# Patient Record
Sex: FEMALE | Race: WHITE | NOT HISPANIC OR LATINO | Employment: OTHER | URBAN - METROPOLITAN AREA
[De-identification: names, ages, dates, MRNs, and addresses within clinical notes are randomized per-mention and may not be internally consistent; named-entity substitution may affect disease eponyms.]

---

## 2022-10-05 DIAGNOSIS — R25.1 TREMOR: Primary | ICD-10-CM

## 2022-10-25 RX ORDER — BENAZEPRIL HYDROCHLORIDE 20 MG/1
20 TABLET ORAL EVERY MORNING
COMMUNITY
Start: 2021-10-07

## 2022-10-25 RX ORDER — CARBIDOPA AND LEVODOPA 25; 100 MG/1; MG/1
1 TABLET ORAL 2 TIMES DAILY
COMMUNITY
Start: 2022-10-04 | End: 2023-04-24 | Stop reason: SDUPTHER

## 2022-10-25 RX ORDER — ASPIRIN 81 MG/1
81 TABLET ORAL EVERY MORNING
COMMUNITY

## 2022-10-25 RX ORDER — NABUMETONE 500 MG/1
500 TABLET, FILM COATED ORAL EVERY MORNING
COMMUNITY
Start: 2021-10-07 | End: 2023-04-24

## 2022-10-25 RX ORDER — AMLODIPINE BESYLATE 5 MG/1
5 TABLET ORAL EVERY MORNING
COMMUNITY
Start: 2021-10-07

## 2022-10-25 RX ORDER — TRAMADOL HYDROCHLORIDE 50 MG/1
50 TABLET ORAL EVERY 12 HOURS PRN
COMMUNITY
Start: 2021-10-07 | End: 2024-02-16

## 2022-10-25 RX ORDER — ATORVASTATIN CALCIUM 10 MG/1
10 TABLET, FILM COATED ORAL EVERY MORNING
COMMUNITY
Start: 2021-10-07

## 2022-10-25 RX ORDER — UBIDECARENONE 30 MG
10 CAPSULE ORAL EVERY MORNING
COMMUNITY

## 2022-10-25 RX ORDER — FUROSEMIDE 20 MG/1
20 TABLET ORAL EVERY MORNING
COMMUNITY
Start: 2021-10-07

## 2022-10-25 RX ORDER — CHOLECALCIFEROL (VITAMIN D3) 25 MCG
1000 TABLET ORAL EVERY MORNING
COMMUNITY

## 2022-10-25 NOTE — DISCHARGE INSTRUCTIONS
Nothing to eat or drink after midnight. Take Amlodipine, Benazepril and Carb/Levodopa AM of procedure with small sip of water.

## 2022-10-31 ENCOUNTER — CLINICAL SUPPORT (OUTPATIENT)
Dept: FAMILY MEDICINE | Facility: CLINIC | Age: 87
End: 2022-10-31
Payer: MEDICARE

## 2022-10-31 PROCEDURE — G0008 ADMIN INFLUENZA VIRUS VAC: HCPCS | Mod: ,,, | Performed by: NURSE PRACTITIONER

## 2022-10-31 PROCEDURE — G0008 FLU VACCINE (QUAD) GREATER THAN OR EQUAL TO 3YO PRESERVATIVE FREE IM: ICD-10-PCS | Mod: ,,, | Performed by: NURSE PRACTITIONER

## 2022-10-31 PROCEDURE — 90686 FLU VACCINE (QUAD) GREATER THAN OR EQUAL TO 3YO PRESERVATIVE FREE IM: ICD-10-PCS | Mod: ,,, | Performed by: NURSE PRACTITIONER

## 2022-10-31 PROCEDURE — 90686 IIV4 VACC NO PRSV 0.5 ML IM: CPT | Mod: ,,, | Performed by: NURSE PRACTITIONER

## 2022-11-06 ENCOUNTER — ANESTHESIA EVENT (OUTPATIENT)
Dept: SURGERY | Facility: HOSPITAL | Age: 87
End: 2022-11-06
Payer: MEDICARE

## 2022-11-06 NOTE — ANESTHESIA PREPROCEDURE EVALUATION
11/06/2022  Juana Xavier is a 86 y.o., female.      Pre-op Assessment    I have reviewed the Patient Summary Reports.     I have reviewed the Nursing Notes. I have reviewed the NPO Status.   I have reviewed the Medications.     Review of Systems  Anesthesia Hx:  Denies Family Hx of Anesthesia complications.   Denies Personal Hx of Anesthesia complications.   Social:  Non-Smoker, No Alcohol Use    Hematology/Oncology:  Hematology Normal   Oncology Normal     EENT/Dental:EENT/Dental Normal   Cardiovascular:   Hypertension ECG has been reviewed.    Pulmonary:  Pulmonary Normal    Renal/:  Renal/ Normal     Hepatic/GI:  Hepatic/GI Normal    Musculoskeletal:  Musculoskeletal Normal    Neurological:  Neurology Normal    Endocrine:  Endocrine Normal    Dermatological:  Skin Normal    Psych:  Psychiatric Normal           Physical Exam  General: Cooperative, Alert and Oriented    Airway:  Mallampati: II   Mouth Opening: Normal  TM Distance: Normal  Tongue: Normal  Neck ROM: Normal ROM    Dental:  Intact        Anesthesia Plan  Type of Anesthesia, risks & benefits discussed:    Anesthesia Type: MAC  Intra-op Monitoring Plan: Standard ASA Monitors  Post Op Pain Control Plan: multimodal analgesia  Informed Consent: Informed consent signed with the Patient and all parties understand the risks and agree with anesthesia plan.  All questions answered. Patient consented to blood products? Yes  ASA Score: 2    Ready For Surgery From Anesthesia Perspective.     .

## 2022-11-07 ENCOUNTER — HOSPITAL ENCOUNTER (OUTPATIENT)
Facility: HOSPITAL | Age: 87
Discharge: HOME | End: 2022-11-07
Attending: OPHTHALMOLOGY | Admitting: OPHTHALMOLOGY
Payer: MEDICARE

## 2022-11-07 ENCOUNTER — ANESTHESIA (OUTPATIENT)
Dept: SURGERY | Facility: HOSPITAL | Age: 87
End: 2022-11-07
Payer: MEDICARE

## 2022-11-07 VITALS
BODY MASS INDEX: 26.68 KG/M2 | OXYGEN SATURATION: 95 % | RESPIRATION RATE: 20 BRPM | WEIGHT: 170 LBS | HEART RATE: 80 BPM | SYSTOLIC BLOOD PRESSURE: 124 MMHG | DIASTOLIC BLOOD PRESSURE: 76 MMHG | HEIGHT: 67 IN | TEMPERATURE: 98 F

## 2022-11-07 DIAGNOSIS — H26.9 CATARACT OF RIGHT EYE, UNSPECIFIED CATARACT TYPE: ICD-10-CM

## 2022-11-07 PROCEDURE — 63600175 PHARM REV CODE 636 W HCPCS: Performed by: NURSE ANESTHETIST, CERTIFIED REGISTERED

## 2022-11-07 PROCEDURE — 36000707: Performed by: OPHTHALMOLOGY

## 2022-11-07 PROCEDURE — 37000008 HC ANESTHESIA 1ST 15 MINUTES: Performed by: OPHTHALMOLOGY

## 2022-11-07 PROCEDURE — 63600175 PHARM REV CODE 636 W HCPCS: Performed by: OPHTHALMOLOGY

## 2022-11-07 PROCEDURE — 25000003 PHARM REV CODE 250: Performed by: OPHTHALMOLOGY

## 2022-11-07 PROCEDURE — 71000015 HC POSTOP RECOV 1ST HR: Performed by: OPHTHALMOLOGY

## 2022-11-07 PROCEDURE — 36000706: Performed by: OPHTHALMOLOGY

## 2022-11-07 PROCEDURE — 71000016 HC POSTOP RECOV ADDL HR: Performed by: OPHTHALMOLOGY

## 2022-11-07 PROCEDURE — 25000003 PHARM REV CODE 250

## 2022-11-07 PROCEDURE — 63600175 PHARM REV CODE 636 W HCPCS

## 2022-11-07 PROCEDURE — 37000009 HC ANESTHESIA EA ADD 15 MINS: Performed by: OPHTHALMOLOGY

## 2022-11-07 PROCEDURE — V2630 ANTER CHAMBER INTRAOCUL LENS: HCPCS | Performed by: OPHTHALMOLOGY

## 2022-11-07 PROCEDURE — 63600175 PHARM REV CODE 636 W HCPCS: Performed by: ANESTHESIOLOGY

## 2022-11-07 DEVICE — IMPLANTABLE DEVICE: Type: IMPLANTABLE DEVICE | Site: EYE | Status: FUNCTIONAL

## 2022-11-07 RX ORDER — TIMOLOL MALEATE 5 MG/ML
SOLUTION/ DROPS OPHTHALMIC
Status: DISCONTINUED | OUTPATIENT
Start: 2022-11-07 | End: 2022-11-07 | Stop reason: HOSPADM

## 2022-11-07 RX ORDER — MIDAZOLAM HYDROCHLORIDE 1 MG/ML
INJECTION INTRAMUSCULAR; INTRAVENOUS
Status: DISCONTINUED | OUTPATIENT
Start: 2022-11-07 | End: 2022-11-07

## 2022-11-07 RX ORDER — GENTAMICIN SULFATE 40 MG/ML
INJECTION, SOLUTION INTRAMUSCULAR; INTRAVENOUS
Status: DISCONTINUED | OUTPATIENT
Start: 2022-11-07 | End: 2022-11-07 | Stop reason: HOSPADM

## 2022-11-07 RX ORDER — NEOMYCIN SULFATE, POLYMYXIN B SULFATE, AND DEXAMETHASONE 3.5; 10000; 1 MG/G; [USP'U]/G; MG/G
OINTMENT OPHTHALMIC
Status: DISCONTINUED | OUTPATIENT
Start: 2022-11-07 | End: 2022-11-07 | Stop reason: HOSPADM

## 2022-11-07 RX ORDER — SODIUM CHLORIDE, SODIUM LACTATE, POTASSIUM CHLORIDE, CALCIUM CHLORIDE 600; 310; 30; 20 MG/100ML; MG/100ML; MG/100ML; MG/100ML
INJECTION, SOLUTION INTRAVENOUS CONTINUOUS
Status: DISCONTINUED | OUTPATIENT
Start: 2022-11-07 | End: 2022-11-07 | Stop reason: HOSPADM

## 2022-11-07 RX ORDER — PILOCARPINE HYDROCHLORIDE 20 MG/ML
SOLUTION/ DROPS OPHTHALMIC
Status: DISCONTINUED | OUTPATIENT
Start: 2022-11-07 | End: 2022-11-07 | Stop reason: HOSPADM

## 2022-11-07 RX ORDER — PHENYLEPHRINE HYDROCHLORIDE 25 MG/ML
1 SOLUTION/ DROPS OPHTHALMIC
Status: COMPLETED | OUTPATIENT
Start: 2022-11-07 | End: 2022-11-07

## 2022-11-07 RX ORDER — TROPICAMIDE 10 MG/ML
1 SOLUTION/ DROPS OPHTHALMIC
Status: COMPLETED | OUTPATIENT
Start: 2022-11-07 | End: 2022-11-07

## 2022-11-07 RX ORDER — PROPARACAINE HYDROCHLORIDE 5 MG/ML
1 SOLUTION/ DROPS OPHTHALMIC ONCE
Status: COMPLETED | OUTPATIENT
Start: 2022-11-07 | End: 2022-11-07

## 2022-11-07 RX ORDER — FENTANYL CITRATE 50 UG/ML
INJECTION, SOLUTION INTRAMUSCULAR; INTRAVENOUS
Status: DISCONTINUED | OUTPATIENT
Start: 2022-11-07 | End: 2022-11-07

## 2022-11-07 RX ORDER — EPINEPHRINE CONVENIENCE KIT 1 MG/ML(1)
KIT INTRAMUSCULAR; SUBCUTANEOUS
Status: DISCONTINUED | OUTPATIENT
Start: 2022-11-07 | End: 2022-11-07 | Stop reason: HOSPADM

## 2022-11-07 RX ADMIN — PROPARACAINE HYDROCHLORIDE 1 DROP: 5 SOLUTION/ DROPS OPHTHALMIC at 06:11

## 2022-11-07 RX ADMIN — PHENYLEPHRINE HYDROCHLORIDE 1 DROP: 25 SOLUTION/ DROPS OPHTHALMIC at 06:11

## 2022-11-07 RX ADMIN — MIDAZOLAM HYDROCHLORIDE 1 MG: 1 INJECTION, SOLUTION INTRAMUSCULAR; INTRAVENOUS at 07:11

## 2022-11-07 RX ADMIN — FENTANYL CITRATE 50 MCG: 50 INJECTION, SOLUTION INTRAMUSCULAR; INTRAVENOUS at 07:11

## 2022-11-07 RX ADMIN — TROPICAMIDE 1 DROP: 10 SOLUTION/ DROPS OPHTHALMIC at 06:11

## 2022-11-07 RX ADMIN — LIDOCAINE HYDROCHLORIDE: 35 GEL OPHTHALMIC at 06:11

## 2022-11-07 RX ADMIN — SODIUM CHLORIDE, POTASSIUM CHLORIDE, SODIUM LACTATE AND CALCIUM CHLORIDE: 600; 310; 30; 20 INJECTION, SOLUTION INTRAVENOUS at 06:11

## 2022-11-07 NOTE — ANESTHESIA POSTPROCEDURE EVALUATION
Anesthesia Post Evaluation    Patient: Juana Xavier    Procedure(s) Performed: Procedure(s) (LRB):  PHACOEMULSIFICATION, CATARACT, WITH IOL INSERTION (Right)    Final Anesthesia Type: MAC      Patient participation: Yes- Able to Participate  Level of consciousness: awake and alert and oriented  Post-procedure vital signs: reviewed and stable  Pain management: adequate  Airway patency: patent    PONV status at discharge: No PONV  Anesthetic complications: no      Cardiovascular status: stable  Respiratory status: unassisted, spontaneous ventilation and room air  Hydration status: euvolemic  Follow-up not needed.          Vitals Value Taken Time   /87 11/07/22 0736   Temp 36.6 °C (97.8 °F) 11/07/22 0642   Pulse 84 11/07/22 0642   Resp 20 11/07/22 0642   SpO2 99 % 11/07/22 0642         No case tracking events are documented in the log.      Pain/Ester Score: No data recorded

## 2023-04-24 ENCOUNTER — OFFICE VISIT (OUTPATIENT)
Dept: NEUROLOGY | Facility: CLINIC | Age: 88
End: 2023-04-24
Payer: MEDICARE

## 2023-04-24 VITALS
WEIGHT: 167 LBS | SYSTOLIC BLOOD PRESSURE: 126 MMHG | DIASTOLIC BLOOD PRESSURE: 90 MMHG | HEIGHT: 67 IN | BODY MASS INDEX: 26.21 KG/M2

## 2023-04-24 DIAGNOSIS — R25.1 TREMOR: ICD-10-CM

## 2023-04-24 DIAGNOSIS — G20.A1 PARKINSON'S DISEASE: Primary | ICD-10-CM

## 2023-04-24 PROCEDURE — 99999 PR PBB SHADOW E&M-EST. PATIENT-LVL III: CPT | Mod: PBBFAC,,, | Performed by: SPECIALIST

## 2023-04-24 PROCEDURE — 99213 OFFICE O/P EST LOW 20 MIN: CPT | Mod: PBBFAC | Performed by: SPECIALIST

## 2023-04-24 PROCEDURE — 99205 OFFICE O/P NEW HI 60 MIN: CPT | Mod: S$PBB,,, | Performed by: SPECIALIST

## 2023-04-24 PROCEDURE — 99999 PR PBB SHADOW E&M-EST. PATIENT-LVL III: ICD-10-PCS | Mod: PBBFAC,,, | Performed by: SPECIALIST

## 2023-04-24 PROCEDURE — 99205 PR OFFICE/OUTPT VISIT, NEW, LEVL V, 60-74 MIN: ICD-10-PCS | Mod: S$PBB,,, | Performed by: SPECIALIST

## 2023-04-24 RX ORDER — CARBIDOPA AND LEVODOPA 25; 100 MG/1; MG/1
1 TABLET ORAL 3 TIMES DAILY
Qty: 270 TABLET | Refills: 3 | Status: SHIPPED | OUTPATIENT
Start: 2023-04-24 | End: 2023-08-23 | Stop reason: SDUPTHER

## 2023-04-24 RX ORDER — NABUMETONE 750 MG/1
750 TABLET, FILM COATED ORAL 2 TIMES DAILY
COMMUNITY
Start: 2023-04-04

## 2023-04-24 NOTE — PROGRESS NOTES
"Subjective:       Patient ID: Juana Xavier is a 87 y.o. female.    Chief Complaint: PD     HPI:            New pt ref by Dr Parra for tremor eval (Here for tremor eval//Pt reports onset of intermittent tremors 1 yr ago; tremors to R hand and jaw. Taking Sinemet  mg (1 tab BID) has improved hand tremors. Use of walker at all times; some R foot shuffling. Drooling has improved some, some speech diff and visual changes at times; no swallowing difficulties or hallucinations. CT Head 2021 under imaging. )      notes may also be on facesheet for HPI, ROS, and other sections     Review of Systems -            Social History     Socioeconomic History    Marital status:    Tobacco Use    Smoking status: Never    Smokeless tobacco: Never   Substance and Sexual Activity    Alcohol use: Not Currently   Social History Narrative    ** Merged History Encounter **         24y  __Working  Retired, worked as:   __drives  _._does not drive     ----------------------------  Breast cancer  HTN (hypertension)    Current Outpatient Medications   Medication Instructions    amLODIPine (NORVASC) 5 mg, Oral, Every morning    aspirin (ECOTRIN) 81 mg, Oral, Every morning    atorvastatin (LIPITOR) 10 mg, Oral, Every morning    benazepriL (LOTENSIN) 20 mg, Oral, Every morning    calcium carbonate/vitamin D3 (CALTRATE 600 + D ORAL) 1 tablet, Oral, Every morning    carbidopa-levodopa  mg (SINEMET)  mg per tablet 1 tablet, Oral, 3 times daily    co-enzyme Q-10 10 mg, Oral, Every morning    furosemide (LASIX) 20 mg, Oral, Every morning    nabumetone (RELAFEN) 750 mg, Oral, 2 times daily    traMADoL (ULTRAM) 50 mg, Oral, Every 12 hours PRN    vitamin D (VITAMIN D3) 1,000 Units, Oral, Every morning    vitamin E 600 Units, Oral, Every morning        Objective:        Exam:   BP (!) 126/90 (BP Location: Left arm, Patient Position: Sitting)   Ht 5' 7" (1.702 m)   Wt 75.8 kg (167 lb)   BMI 26.16 kg/m²     General " Exam  __unaccompanied  if accompanied, by__ sister   body habitus_ Body mass index is 26.16 kg/m².    mental status_alert and appropriate  oropharynx_Mallampati grade_  neck_  Heart__ RRR syst murmur   extremities_  skin_    Neurological:  cortical function__  sharp   Speech __ normal   cranial nerves:  CN 2 VF_ok   fundi_   CN 3, 4, 6 EOMs_slow   CN 3, pupils_ok    CN 7_no lower face asymmetry  CN 8_hearing _ ok  CN 12 tongue_ok    Motor__ decr RUE dexterity due to PD   tone:   muscle stretch reflexes__  Vib sens_  Pin sens_  plantars__  tremor: _ minimal if any   coordination: _  gait_ walker  slow   Romberg:     Neuroimaging:  Study / studies: ct h 2021  __Images and imaging reports reviewed.      Rads summary: No acute or adverse intracranial finding          My comments: agree ok for age     Labs:      _._ new patient   ___ multiple issues/ diagnoses or problems  [if not enumerated in note then discussed in encounter but not documented]    complexity of data     _._high _mod   _._ images and reports reviewed:  _._ hx obtained from family or accompaniment:   __other studies reviewed   __studies ordered __   __studies considered or discussed but not ordered __  __DDx discussed __    Risks    _._high _mod   _._ (possible or definite) neurodegenerative condition and inherent progression  __ (poss or def) autoimmune condition with possibility of flares or unexpected attack  __ (poss or def) seiz d.o. with possib of recurr seiz's   __ cerebrovasc ds with risk of recurrence of stroke  __ CNS meds (and/or) potentially high risk non CNS meds which may cause medical or behavioral side effects  _._ fall risk  _._ driving discussed   __ diagnosis unclear or DDx wide making risk uncertain to high  __other:    MDM/Medical Decision Making     .__high  _moderate     Parkinson's medications can be associated with certain side effects.  Nausea and abdominal symptoms typically improve in time.  Impulse control disorders including  persistent thoughts or behaviors involving shopping gambling or sex can be problematic.  Excessive daytime sleepiness including car crashes have been reported.   Delusions hallucinations and paranoia can also occur, typically with higher doses in older patients.   Abrupt stoppage of high dose parkinson's medications can be medically troublesome.            Assessment/Plan:       Problem List Items Addressed This Visit          Neuro    Parkinson's disease - Primary     Other Visit Diagnoses       Tremor                  Other comments/ follow up:        Imaging orders(if any):   No orders of the defined types were placed in this encounter.     Medications Ordered This Encounter   Medications    carbidopa-levodopa  mg (SINEMET)  mg per tablet     Sig: Take 1 tablet by mouth 3 (three) times daily.     Dispense:  270 tablet     Refill:  3          Aim follow up __6wk     MD KRISHNA WayA

## 2023-06-05 ENCOUNTER — OFFICE VISIT (OUTPATIENT)
Dept: NEUROLOGY | Facility: CLINIC | Age: 88
End: 2023-06-05
Payer: MEDICARE

## 2023-06-05 VITALS
HEIGHT: 67 IN | WEIGHT: 167 LBS | BODY MASS INDEX: 26.21 KG/M2 | SYSTOLIC BLOOD PRESSURE: 124 MMHG | DIASTOLIC BLOOD PRESSURE: 86 MMHG

## 2023-06-05 DIAGNOSIS — G20.A1 PARKINSON'S DISEASE: Primary | ICD-10-CM

## 2023-06-05 PROCEDURE — 99999 PR PBB SHADOW E&M-EST. PATIENT-LVL IV: ICD-10-PCS | Mod: PBBFAC,,, | Performed by: NURSE PRACTITIONER

## 2023-06-05 PROCEDURE — 99214 OFFICE O/P EST MOD 30 MIN: CPT | Mod: PBBFAC | Performed by: NURSE PRACTITIONER

## 2023-06-05 PROCEDURE — 99999 PR PBB SHADOW E&M-EST. PATIENT-LVL IV: CPT | Mod: PBBFAC,,, | Performed by: NURSE PRACTITIONER

## 2023-06-05 PROCEDURE — 99213 OFFICE O/P EST LOW 20 MIN: CPT | Mod: S$PBB,,, | Performed by: NURSE PRACTITIONER

## 2023-06-05 PROCEDURE — 99213 PR OFFICE/OUTPT VISIT, EST, LEVL III, 20-29 MIN: ICD-10-PCS | Mod: S$PBB,,, | Performed by: NURSE PRACTITIONER

## 2023-06-05 NOTE — PROGRESS NOTES
"  Neurology Follow up Note    Subjective:         Patient ID: Juana Xavier is a 87 y.o. female.    Chief Complaint: 6 week PD f/u    HPI:            Pt reports tremors better controlled since CD/LD dose incr last visit; taking Sinemet  mg (1 tab TID). Unsteady gait unchanged; no swallowing diff, hallucinations or recent falls.     Overall, feeling "much better"    ROS: as per HPI, otherwise pertinent systems review is negative          Past Medical History:   Diagnosis Date    Breast cancer     HTN (hypertension)        Past Surgical History:   Procedure Laterality Date    BREAST LUMPECTOMY Right     HIP REPLACEMENT ARTHROPLASTY Left     TONSILLECTOMY         Family History   Problem Relation Age of Onset    Heart disease Mother     Heart disease Father        Social History     Socioeconomic History    Marital status:    Tobacco Use    Smoking status: Never    Smokeless tobacco: Never   Substance and Sexual Activity    Alcohol use: Not Currently   Social History Narrative    ** Merged History Encounter **            Review of patient's allergies indicates:  No Known Allergies    Current Outpatient Medications   Medication Instructions    amLODIPine (NORVASC) 5 mg, Oral, Every morning    aspirin (ECOTRIN) 81 mg, Oral, Every morning    atorvastatin (LIPITOR) 10 mg, Oral, Every morning    benazepriL (LOTENSIN) 20 mg, Oral, Every morning    calcium carbonate/vitamin D3 (CALTRATE 600 + D ORAL) 1 tablet, Oral, Every morning    carbidopa-levodopa  mg (SINEMET)  mg per tablet 1 tablet, Oral, 3 times daily    co-enzyme Q-10 10 mg, Oral, Every morning    furosemide (LASIX) 20 mg, Oral, Every morning    nabumetone (RELAFEN) 750 mg, Oral, 2 times daily    traMADoL (ULTRAM) 50 mg, Oral, Every 12 hours PRN    vitamin D (VITAMIN D3) 1,000 Units, Oral, Every morning    vitamin E 600 Units, Oral, Every morning       Objective:      Exam:   Visit Vitals  /86 (BP Location: Left arm, Patient " "Position: Sitting)   Ht 5' 7" (1.702 m)   Wt 75.8 kg (167 lb)   BMI 26.16 kg/m²       Physical Exam  Vitals reviewed.   Constitutional:       Appearance: Normal appearance.      Accompanied by: two sisters  HENT:      Ears:      Comments: Hearing normal.  Eyes:      Extraocular Movements: Extraocular movements intact.      VF's nml  Cardiovascular:      Rate and Rhythm: Normal rate and regular rhythm. Systolic murmur   Pulmonary:      Effort: Pulmonary effort is normal.      Breath sounds: Normal breath sounds.   Musculoskeletal:         General: Normal range of motion.   Skin:     General: Skin is warm and dry.   Neurological:      General: No focal deficit present.      Mental Status: she is alert and oriented to person, place, and time.      Speech: nml     Face: symmetric     Motor: nonlateralizing      Coordination: F to N ok      Sensation: Vib nml; pin, if done, was nml      Reflexes: B knee jerks nml; B ankle jerks nml      Tone: B cogwheel rigidity; R>L     Tremor: LUE rest tremor     Gait : rollator; parkinsonian  Psychiatric:         Mood and Affect: Mood normal.         Behavior: Behavior normal.         Assessment/Plan:   1. Parkinson's disease  Continue present management  CD/LD 25/100 mg tab, 1 tab three times per day    Reminded patient/family about potential PD med side effects, which include but not limited to impulse control disorders (ex: pathologic gambling, shopping, or preoccupation with sexual thoughts or behaviors), excessive daytime sleepiness, hallucinations and sleep attacks. Discussed that driving may pose an increased risk to patients on these medications. Hypotension is also occasionally associated with Parkinson's medications. Any of these complications should be reported to the prescribing physician or provider so that appropriate further modifications can occur.    Fall precautions discussed    She does not drive    Safety discussed; fall mitigation techniques discussed       Follow " up in about 3 months (around 9/5/2023), or if symptoms worsen or fail to improve, for Parkinson's Disease.      Don Nieto, MSN, APRN, AGACNP-BC

## 2023-08-23 ENCOUNTER — OFFICE VISIT (OUTPATIENT)
Dept: NEUROLOGY | Facility: CLINIC | Age: 88
End: 2023-08-23
Payer: MEDICARE

## 2023-08-23 VITALS
DIASTOLIC BLOOD PRESSURE: 94 MMHG | WEIGHT: 167 LBS | HEIGHT: 67 IN | BODY MASS INDEX: 26.21 KG/M2 | SYSTOLIC BLOOD PRESSURE: 162 MMHG

## 2023-08-23 DIAGNOSIS — G20.A1 PARKINSON'S DISEASE: Primary | ICD-10-CM

## 2023-08-23 PROCEDURE — 99214 PR OFFICE/OUTPT VISIT, EST, LEVL IV, 30-39 MIN: ICD-10-PCS | Mod: S$PBB,,, | Performed by: NURSE PRACTITIONER

## 2023-08-23 PROCEDURE — 99999 PR PBB SHADOW E&M-EST. PATIENT-LVL III: CPT | Mod: PBBFAC,,, | Performed by: NURSE PRACTITIONER

## 2023-08-23 PROCEDURE — 99999 PR PBB SHADOW E&M-EST. PATIENT-LVL III: ICD-10-PCS | Mod: PBBFAC,,, | Performed by: NURSE PRACTITIONER

## 2023-08-23 PROCEDURE — 99213 OFFICE O/P EST LOW 20 MIN: CPT | Mod: PBBFAC | Performed by: NURSE PRACTITIONER

## 2023-08-23 PROCEDURE — 99214 OFFICE O/P EST MOD 30 MIN: CPT | Mod: S$PBB,,, | Performed by: NURSE PRACTITIONER

## 2023-08-23 RX ORDER — MULTIVITAMIN
1 TABLET ORAL DAILY
COMMUNITY

## 2023-08-23 RX ORDER — CARBIDOPA AND LEVODOPA 25; 100 MG/1; MG/1
1 TABLET ORAL 4 TIMES DAILY
Qty: 120 TABLET | Refills: 3 | Status: SHIPPED | OUTPATIENT
Start: 2023-08-23 | End: 2023-10-11 | Stop reason: SDUPTHER

## 2023-08-23 NOTE — PROGRESS NOTES
Neurology Follow up Note    Subjective:         Patient ID: Juana Xavier is a 87 y.o. female.    Chief Complaint: F/U PD.    HPI:            Pt states she has chin tremors that are abt the same. L hand rest tremor    States she has a shuffled gait wo falls or freezing. Ambulates w a Rolator. Reports increased stiffness and limited ROM, even challenging getting in and out of the car.     Diff w speech and drooling. Denies diff w swallowing or hallucinations.     Sleeping well  w vivid dreams.     Does not drive, lives alone and her sister lives next door, and is able to do all ADL's alone.     CD/LD 25/100 mg tab, 1 tab three times per day    ROS: as per HPI, otherwise pertinent systems review is negative          Past Medical History:   Diagnosis Date    Breast cancer     HTN (hypertension)        Past Surgical History:   Procedure Laterality Date    BREAST LUMPECTOMY Right     HIP REPLACEMENT ARTHROPLASTY Left     TONSILLECTOMY         Family History   Problem Relation Age of Onset    Heart disease Mother     Heart disease Father        Social History     Socioeconomic History    Marital status:    Tobacco Use    Smoking status: Never    Smokeless tobacco: Never   Substance and Sexual Activity    Alcohol use: Not Currently   Social History Narrative    ** Merged History Encounter **            Review of patient's allergies indicates:  No Known Allergies    Current Outpatient Medications   Medication Instructions    amLODIPine (NORVASC) 5 mg, Oral, Every morning    aspirin (ECOTRIN) 81 mg, Oral, Every morning    atorvastatin (LIPITOR) 10 mg, Oral, Every morning    benazepriL (LOTENSIN) 20 mg, Oral, Every morning    calcium carbonate/vitamin D3 (CALTRATE 600 + D ORAL) 1 tablet, Oral, Every morning    carbidopa-levodopa  mg (SINEMET)  mg per tablet 1 tablet, Oral, 3 times daily    co-enzyme Q-10 10 mg, Oral, Every morning    furosemide (LASIX) 20 mg, Oral, Every morning    multivitamin (ONE  "DAILY MULTIVITAMIN) per tablet 1 tablet, Oral, Daily    nabumetone (RELAFEN) 750 mg, Oral, 2 times daily    traMADoL (ULTRAM) 50 mg, Oral, Every 12 hours PRN    vitamin D (VITAMIN D3) 1,000 Units, Oral, Every morning    vitamin E 600 Units, Oral, Every morning       Objective:      Exam:   Visit Vitals  BP (!) 162/94   Ht 5' 7" (1.702 m)   Wt 75.8 kg (167 lb)   BMI 26.16 kg/m²       Physical Exam  Vitals reviewed.   Constitutional:       Appearance: Parkinsonian; masked face and decreased eye blink     Accompanied by: two sisters  HENT:      Ears:      Comments: Hearing normal.  Eyes:      Extraocular Movements: Extraocular movements intact.      VF's nml  Cardiovascular:      Rate and Rhythm: Normal rate and regular rhythm. Systolic murmur   Pulmonary:      Effort: Pulmonary effort is normal.      Breath sounds: Normal breath sounds.   Musculoskeletal:         General: Normal range of motion.      1+ B pretibial edema  Skin:     General: Skin is warm and dry.   Neurological:      General: No focal deficit present.      Mental Status: she is alert and oriented to person, place, and time.      Speech: monotoned and hypophonia, slightly dysarthric      Face: symmetric     Motor: nonlateralizing      Coordination: F to N bradykinetic      Tone: B cogwheel rigidity; R>L     Tremor: LUE rest tremor     Gait : rollator; parkinsonian  Psychiatric:         Mood and Affect: Mood normal.         Behavior: Behavior normal.            Assessment/Plan:   1. Parkinson's disease    Rigidity and tremor; increase the CD/LD 25/100 mg tab to 1 tab four times per day.  7a, 11, 3p, 7pm    Reminded patient/family about potential PD med side effects, which include but not limited to impulse control disorders (ex: pathologic gambling, shopping, or preoccupation with sexual thoughts or behaviors), excessive daytime sleepiness, hallucinations and sleep attacks. Discussed that driving may pose an increased risk to patients on these " medications. Hypotension is also occasionally associated with Parkinson's medications. Any of these complications should be reported to the prescribing physician or provider so that appropriate further modifications can occur.    Fall precautions discussed    BP recheck at end of visit 162/90; I ask that she reach out to PCP to report elevated BP; she agrees to do so today    Follow up in about 2 months (around 10/23/2023) for Parkinson's Disease.      Don Nieto, MSN, APRN, AGACNP-BC

## 2023-10-11 ENCOUNTER — OFFICE VISIT (OUTPATIENT)
Dept: NEUROLOGY | Facility: CLINIC | Age: 88
End: 2023-10-11
Payer: MEDICARE

## 2023-10-11 VITALS
HEIGHT: 67 IN | SYSTOLIC BLOOD PRESSURE: 154 MMHG | WEIGHT: 167 LBS | DIASTOLIC BLOOD PRESSURE: 98 MMHG | BODY MASS INDEX: 26.21 KG/M2

## 2023-10-11 DIAGNOSIS — G20.A1 PARKINSON'S DISEASE WITHOUT DYSKINESIA OR FLUCTUATING MANIFESTATIONS: Primary | ICD-10-CM

## 2023-10-11 DIAGNOSIS — G20.A1 PARKINSON'S DISEASE: ICD-10-CM

## 2023-10-11 PROCEDURE — 99999 PR PBB SHADOW E&M-EST. PATIENT-LVL III: CPT | Mod: PBBFAC,,, | Performed by: NURSE PRACTITIONER

## 2023-10-11 PROCEDURE — 99214 PR OFFICE/OUTPT VISIT, EST, LEVL IV, 30-39 MIN: ICD-10-PCS | Mod: S$PBB,,, | Performed by: NURSE PRACTITIONER

## 2023-10-11 PROCEDURE — 99213 OFFICE O/P EST LOW 20 MIN: CPT | Mod: PBBFAC | Performed by: NURSE PRACTITIONER

## 2023-10-11 PROCEDURE — 99999 PR PBB SHADOW E&M-EST. PATIENT-LVL III: ICD-10-PCS | Mod: PBBFAC,,, | Performed by: NURSE PRACTITIONER

## 2023-10-11 PROCEDURE — 99214 OFFICE O/P EST MOD 30 MIN: CPT | Mod: S$PBB,,, | Performed by: NURSE PRACTITIONER

## 2023-10-11 RX ORDER — CARBIDOPA AND LEVODOPA 25; 100 MG/1; MG/1
1 TABLET ORAL 4 TIMES DAILY
Qty: 360 TABLET | Refills: 3 | Status: SHIPPED | OUTPATIENT
Start: 2023-10-11

## 2023-10-11 NOTE — PROGRESS NOTES
Neurology Follow up Note    Subjective:         Patient ID: Juana Xavier is a 87 y.o. female.    Chief Complaint: F/U PD.     HPI:            Overall, improvement in PD symptoms, rigidity and tremor since we increased the CD/LD to 1 tab four times per day last visit    States she has shuffled; Denies freezing gait or falls.   Occs diff w speech and drooling.   Denies diff w swallowing or hallucinations.     Pt does not drive, lives alone and is able to do all ADL's alone      ROS: as per HPI, otherwise pertinent systems review is negative          Past Medical History:   Diagnosis Date    Breast cancer     HTN (hypertension)        Past Surgical History:   Procedure Laterality Date    BREAST LUMPECTOMY Right     HIP REPLACEMENT ARTHROPLASTY Left     TONSILLECTOMY         Family History   Problem Relation Age of Onset    Heart disease Mother     Heart disease Father        Social History     Socioeconomic History    Marital status:    Tobacco Use    Smoking status: Never    Smokeless tobacco: Never   Substance and Sexual Activity    Alcohol use: Not Currently   Social History Narrative    ** Merged History Encounter **            Review of patient's allergies indicates:  No Known Allergies    Current Outpatient Medications   Medication Instructions    amLODIPine (NORVASC) 5 mg, Oral, Every morning    aspirin (ECOTRIN) 81 mg, Oral, Every morning    atorvastatin (LIPITOR) 10 mg, Oral, Every morning    benazepriL (LOTENSIN) 20 mg, Oral, Every morning    calcium carbonate/vitamin D3 (CALTRATE 600 + D ORAL) 1 tablet, Oral, Every morning    carbidopa-levodopa  mg (SINEMET)  mg per tablet 1 tablet, Oral, 4 times daily    co-enzyme Q-10 10 mg, Oral, Every morning    furosemide (LASIX) 20 mg, Oral, Every morning    multivitamin (ONE DAILY MULTIVITAMIN) per tablet 1 tablet, Oral, Daily    nabumetone (RELAFEN) 750 mg, Oral, 2 times daily    traMADoL (ULTRAM) 50 mg, Oral, Every 12 hours PRN    vitamin D  "(VITAMIN D3) 1,000 Units, Oral, Every morning    vitamin E 600 Units, Oral, Every morning       Objective:      Exam:   Visit Vitals  BP (!) 154/98   Ht 5' 7" (1.702 m)   Wt 75.8 kg (167 lb)   BMI 26.16 kg/m²       Physical Exam  Vitals reviewed.   Constitutional:       Appearance: Parkinsonian; masked face and decreased eye blink     Accompanied by: two sisters  HENT:      Ears:      Comments: Hearing normal.  Eyes:      Extraocular Movements: Extraocular movements intact.      VF's nml  Cardiovascular:      Rate and Rhythm: Normal rate and regular rhythm. Systolic murmur   Pulmonary:      Effort: Pulmonary effort is normal.      Breath sounds: Normal breath sounds.   Musculoskeletal:         General: Normal range of motion.      1+ B pretibial edema  Skin:     General: Skin is warm and dry.   Neurological:      General: No focal deficit present.      Mental Status: she is alert and oriented to person, place, and time.      Speech: monotoned and hypophonia, slightly dysarthric      Face: symmetric     Motor: nonlateralizing      Coordination: F to N bradykinetic      Tone: B cogwheel rigidity; R>L     Tremor: LUE rest tremor     Gait : rollator; parkinsonian  Impaired RAE R>L  Psychiatric:         Mood and Affect: Mood normal.         Behavior: Behavior normal.         Assessment/Plan:   1. Parkinson's disease without dyskinesia or fluctuating manifestations  Continue the CD/LD 1 tab four times per day    Reminded patient/family about potential PD med side effects, which include but not limited to impulse control disorders (ex: pathologic gambling, shopping, or preoccupation with sexual thoughts or behaviors), excessive daytime sleepiness, hallucinations and sleep attacks. Discussed that driving may pose an increased risk to patients on these medications. Hypotension is also occasionally associated with Parkinson's medications. Any of these complications should be reported to the prescribing physician or provider " so that appropriate further modifications can occur.    Fall precautions discussed        Follow up in about 4 months (around 2/11/2024) for Parkinson's Disease.      Don Nieto, MSN, APRN, AGACNP-BC

## 2024-01-02 NOTE — HIM RECORD RETIREMENT NOTE
[facility] of Incomplete Medical Record    1/2/24    Patient Name: Juana Xavier  Contact Serial # (CSN): [id number]  Patient Medical Record # (MRN): [id number]  Date of Service: Hospital Encounter on 11/7/2022  Physician Name: Anthony Wong Jr., MD [[id number]];     This record has been reviewed and is being retired as incomplete by the approval of the  Medical Executive Committee ([facility])     On 11/14/2023., due to:  Refusal of Provider     Missing Information/Comments:  []    Discharge Summary   [x]    DC Note/Short Stay Summary   []    ED Provider Note   []    Delivery Note   []    History & Physical   []   Operative Note   []     Procedure Note   [x]     Physician Order   [x]     Verbal Order   []       Other, specify:

## 2024-02-16 ENCOUNTER — OFFICE VISIT (OUTPATIENT)
Dept: NEUROLOGY | Facility: CLINIC | Age: 89
End: 2024-02-16
Payer: MEDICARE

## 2024-02-16 VITALS
WEIGHT: 173 LBS | BODY MASS INDEX: 27.15 KG/M2 | HEIGHT: 67 IN | DIASTOLIC BLOOD PRESSURE: 90 MMHG | SYSTOLIC BLOOD PRESSURE: 128 MMHG

## 2024-02-16 DIAGNOSIS — G45.9 TIA (TRANSIENT ISCHEMIC ATTACK): Primary | ICD-10-CM

## 2024-02-16 DIAGNOSIS — G20.A1 PARKINSON'S DISEASE WITHOUT DYSKINESIA OR FLUCTUATING MANIFESTATIONS: ICD-10-CM

## 2024-02-16 PROCEDURE — 99213 OFFICE O/P EST LOW 20 MIN: CPT | Mod: PBBFAC | Performed by: NURSE PRACTITIONER

## 2024-02-16 PROCEDURE — 99214 OFFICE O/P EST MOD 30 MIN: CPT | Mod: S$PBB,,, | Performed by: NURSE PRACTITIONER

## 2024-02-16 PROCEDURE — 99999 PR PBB SHADOW E&M-EST. PATIENT-LVL III: CPT | Mod: PBBFAC,,, | Performed by: NURSE PRACTITIONER

## 2024-02-16 NOTE — PROGRESS NOTES
Neurology Follow up Note    Subjective:         Patient ID: Juana Xavier is a 88 y.o. female.    Chief Complaint: 4 month PD f/u     HPI:            Pt reports some tremors during night but mild; only lasting few seconds at a time. Unsteady gait unchanged; denies recent falls. Taking Sinemet  mg (1 tab QID). No swallowing diff or hallucinations; occ drooling. Sleeps well about half the time.     Intermittent episodes of R facial droop and occassionally will last up to half a day then resolves     ROS: as per HPI, otherwise pertinent systems review is negative          Past Medical History:   Diagnosis Date    Breast cancer     HTN (hypertension)        Past Surgical History:   Procedure Laterality Date    BREAST LUMPECTOMY Right     HIP REPLACEMENT ARTHROPLASTY Left     TONSILLECTOMY         Family History   Problem Relation Age of Onset    Heart disease Mother     Heart disease Father        Social History     Socioeconomic History    Marital status:    Tobacco Use    Smoking status: Never    Smokeless tobacco: Never   Substance and Sexual Activity    Alcohol use: Not Currently   Social History Narrative    ** Merged History Encounter **            Review of patient's allergies indicates:  No Known Allergies    Current Outpatient Medications   Medication Instructions    amLODIPine (NORVASC) 5 mg, Oral, Every morning    aspirin (ECOTRIN) 81 mg, Oral, Every morning    atorvastatin (LIPITOR) 10 mg, Oral, Every morning    benazepriL (LOTENSIN) 20 mg, Oral, Every morning    calcium carbonate/vitamin D3 (CALTRATE 600 + D ORAL) 1 tablet, Oral, Every morning    carbidopa-levodopa  mg (SINEMET)  mg per tablet 1 tablet, Oral, 4 times daily    co-enzyme Q-10 10 mg, Oral, Every morning    furosemide (LASIX) 20 mg, Oral, Every morning    multivitamin (ONE DAILY MULTIVITAMIN) per tablet 1 tablet, Oral, Daily    nabumetone (RELAFEN) 750 mg, Oral, 2 times daily    vitamin D (VITAMIN D3) 1,000 Units,  "Oral, Every morning    vitamin E 600 Units, Oral, Every morning       Objective:      Exam:   Visit Vitals  BP (!) 128/90 (BP Location: Left arm, Patient Position: Sitting)   Ht 5' 7" (1.702 m)   Wt 78.5 kg (173 lb)   BMI 27.10 kg/m²       Physical Exam  Vitals reviewed.   Constitutional:       Appearance: Parkinsonian; masked face and decreased eye blink     Accompanied by: two sisters  HENT:      Ears:      Comments: Hearing normal.  Eyes:      Extraocular Movements: Extraocular movements intact.      VF's nml  Cardiovascular:      Rate and Rhythm: Normal rate and regular rhythm. Systolic murmur   Pulmonary:      Effort: Pulmonary effort is normal.      Breath sounds: Normal breath sounds.   Musculoskeletal:         General: Normal range of motion.      1+ B pretibial edema  Skin:     General: Skin is warm and dry.   Neurological:      General: No focal deficit present.      Mental Status: she is alert and oriented to person, place, and time.      Speech: monotoned and hypophonia, slightly dysarthric      Face: symmetric     Motor: nonlateralizing      Coordination: F to N bradykinetic      Tone: B cogwheel rigidity; R>L     Tremor: RUE rest tremor     Gait : rollator; parkinsonian  Impaired RAE R>L  Psychiatric:         Mood and Affect: Mood normal.         Behavior: Behavior normal.         Assessment/Plan:   1. Parkinson's disease without dyskinesia or fluctuating manifestations  Continue CD/LD 1 tab four times per day     Reminded patient/family about potential PD med side effects, which include but not limited to impulse control disorders (ex: pathologic gambling, shopping, or preoccupation with sexual thoughts or behaviors), excessive daytime sleepiness, hallucinations and sleep attacks. Discussed that driving may pose an increased risk to patients on these medications. Hypotension is also occasionally associated with Parkinson's medications. Any of these complications should be reported to the prescribing " physician or provider so that appropriate further modifications can occur.    Fall precautions discussed    Offered referral to ST - they declined     Does not drive    Lives next door to her sisters    2. TIA  Will ask for CUS  Recent TTE per cardio was ok reportedly     She takes ASA, Lipitor, and antihypertensives    Follow up in about 3 months (around 5/16/2024), or if symptoms worsen or fail to improve, for Parkinson's Disease.      Don Nieto, MSN, APRN, AGACNP-BC

## 2024-02-20 DIAGNOSIS — G20.A1 PARKINSON'S DISEASE WITHOUT DYSKINESIA OR FLUCTUATING MANIFESTATIONS: ICD-10-CM

## 2024-02-20 DIAGNOSIS — G45.9 TIA (TRANSIENT ISCHEMIC ATTACK): Primary | ICD-10-CM

## 2024-07-16 ENCOUNTER — OFFICE VISIT (OUTPATIENT)
Dept: NEUROLOGY | Facility: CLINIC | Age: 89
End: 2024-07-16
Payer: MEDICARE

## 2024-07-16 VITALS
SYSTOLIC BLOOD PRESSURE: 144 MMHG | BODY MASS INDEX: 27.15 KG/M2 | DIASTOLIC BLOOD PRESSURE: 84 MMHG | HEIGHT: 67 IN | WEIGHT: 173 LBS

## 2024-07-16 DIAGNOSIS — R26.81 GAIT INSTABILITY: ICD-10-CM

## 2024-07-16 DIAGNOSIS — G51.31 HEMIFACIAL SPASM OF RIGHT SIDE OF FACE: ICD-10-CM

## 2024-07-16 DIAGNOSIS — G20.A1 PARKINSON'S DISEASE WITHOUT DYSKINESIA OR FLUCTUATING MANIFESTATIONS: Primary | ICD-10-CM

## 2024-07-16 DIAGNOSIS — G20.A1 PARKINSON'S DISEASE: ICD-10-CM

## 2024-07-16 PROCEDURE — 99999 PR PBB SHADOW E&M-EST. PATIENT-LVL III: CPT | Mod: PBBFAC,,, | Performed by: NURSE PRACTITIONER

## 2024-07-16 PROCEDURE — 99213 OFFICE O/P EST LOW 20 MIN: CPT | Mod: PBBFAC | Performed by: NURSE PRACTITIONER

## 2024-07-16 PROCEDURE — 99215 OFFICE O/P EST HI 40 MIN: CPT | Mod: S$PBB,,, | Performed by: NURSE PRACTITIONER

## 2024-07-16 RX ORDER — CARBIDOPA AND LEVODOPA 25; 100 MG/1; MG/1
1 TABLET ORAL 4 TIMES DAILY
Qty: 360 TABLET | Refills: 3 | Status: SHIPPED | OUTPATIENT
Start: 2024-07-16 | End: 2025-07-16

## 2024-07-16 NOTE — PROGRESS NOTES
Established Parkinson's Patient   SUBJECTIVE:  Patient ID: Juana Xavier   88 y.o.     Past Medical History:   Diagnosis Date    Breast cancer     HTN (hypertension)      Past Surgical History:   Procedure Laterality Date    BREAST LUMPECTOMY Right     HIP REPLACEMENT ARTHROPLASTY Left     TONSILLECTOMY       Review of patient's allergies indicates:  No Known Allergies    Chief Complaint: F/U PD.  (Chin and rt hand tremors are abt the same. Shuffled gait wo falls. Ambulates w a Rolator. Diff w speech and drooling. Denies diff w swallowing or hallucinations. Sleeping well wo vivid dreams. Does not drive, lives at home and is able to do all ADL's alone. Pts sisters (Honey) and (Hyacinth) are here today. )      History of Present Illness:  Juana Xavier is a 88 y.o. female who presents to clinic for Parkinsons Disease follow-up     Currently taking Sinemet 25-100mg, 1 QID    Chin and R hand tremors are about the same.     Shuffled gait without falls. Ambulates with a Rolator.     Difficulty with speech and drooling.   Denies difficulty with swallowing or hallucinations.     Sleeping well without vivid dreams.     Does not drive, lives at home and is able to do all ADL's alone.     Pts sisters (Honey) and (Hyacinth) are here today     Occasional R face pulling sensation, which causes eye to close and mouth to draw up    Review of Systems: as per HPI, otherwise a balanced 10 systems review is negative.    Current Medications:  Current Outpatient Medications   Medication Instructions    amLODIPine (NORVASC) 5 mg, Oral, Every morning    aspirin (ECOTRIN) 81 mg, Oral, Every morning    atorvastatin (LIPITOR) 10 mg, Oral, Every morning    benazepriL (LOTENSIN) 20 mg, Oral, Every morning    calcium carbonate/vitamin D3 (CALTRATE 600 + D ORAL) 1 tablet, Oral, Every morning    carbidopa-levodopa  mg (SINEMET)  mg per tablet 1 tablet, Oral, 4 times daily    co-enzyme Q-10 10 mg, Oral, Every morning    furosemide  "(LASIX) 20 mg, Oral, Every morning    multivitamin (ONE DAILY MULTIVITAMIN) per tablet 1 tablet, Oral, Daily    nabumetone (RELAFEN) 750 mg, Oral, 2 times daily    vitamin D (VITAMIN D3) 1,000 Units, Oral, Every morning    vitamin E 600 Units, Oral, Every morning       OBJECTIVE:  Vitals:  BP (!) 144/84   Ht 5' 7" (1.702 m)   Wt 78.5 kg (173 lb)   BMI 27.10 kg/m²      Physical Exam:  General Exam  Accompanied by - sisters  appearance - flat affect; decreased eye blink  skin- no obvious lesions noted    Neurologic Exam  Cortical function - The patient is alert, attentive  Speech - hypophonia  cranial nerves:  CN 2 - visual fields are full to confrontation.   CN 3, 4, 6 EOMs - normal. No ptosis or lateral gaze deviation  CN 7 - no face asymmetry; normal eye closure and smile  CN 8 - hearing is grossly normal  motor - No pronator drift. Decreased Muscle bulk and tone   gait - uses walker. Cautious gait. Precarious turns  coordination - finger to nose intact.   tremor - none today  rigidity - R wrist rigidity    3/2024 Carotid US: Focally predominantly calcified plaque at bifurcations      ASSESSMENT /PLAN:    Problem List Items Addressed This Visit          Neuro    Parkinson's disease without dyskinesia or fluctuating manifestations - Primary    Continue taking Sinemet 25-100mg, 1 QID    Parkinson's medications can be associated with certain side effects.  Nausea and abdominal symptoms typically improve in time.  Impulse control disorders including persistent thoughts or behaviors involving shopping gambling or sex can be problematic.  Excessive daytime sleepiness including car crashes have been reported.   Delusions hallucinations and paranoia can also occur, typically with higher doses in older patients.   Abrupt stoppage of high dose parkinson's medications can be medically troublesome.      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    Advised pt to get medic alert button    Will order a shower chair    Discussed ordering HH/PT       Questions and concerns were sought and answered to the patient's stated verbal satisfaction.    The patient verbalizes understanding and agreement with the above stated treatment plan.   Items discussed include acute and/or chronic neurological, sleep, or other issues and their attendant differential diagnoses.  Potential for additional testing, treatment options, and prognosis also discussed.    ___single dx __*_multiple issues/ diagnoses  ___ low __ mod __*_ high complexity of data  ___low __mod __*_ high risks     Medical Decision Making (MDM) used for CPT choice:  ___low  ___moderate  _*___high        LEX Mills  [facility]  [phone]

## 2024-07-19 ENCOUNTER — TELEPHONE (OUTPATIENT)
Dept: NEUROLOGY | Facility: CLINIC | Age: 89
End: 2024-07-19
Payer: MEDICARE

## 2024-07-19 DIAGNOSIS — G45.9 TIA (TRANSIENT ISCHEMIC ATTACK): ICD-10-CM

## 2024-07-19 DIAGNOSIS — G20.A1 PARKINSON'S DISEASE WITHOUT DYSKINESIA OR FLUCTUATING MANIFESTATIONS: ICD-10-CM

## 2024-11-26 ENCOUNTER — OFFICE VISIT (OUTPATIENT)
Dept: NEUROLOGY | Facility: CLINIC | Age: 89
End: 2024-11-26
Payer: MEDICARE

## 2024-11-26 VITALS
SYSTOLIC BLOOD PRESSURE: 150 MMHG | DIASTOLIC BLOOD PRESSURE: 88 MMHG | HEIGHT: 67 IN | WEIGHT: 175 LBS | BODY MASS INDEX: 27.47 KG/M2

## 2024-11-26 DIAGNOSIS — G20.A1 PARKINSON'S DISEASE WITHOUT DYSKINESIA OR FLUCTUATING MANIFESTATIONS: Primary | ICD-10-CM

## 2024-11-26 PROCEDURE — 1160F RVW MEDS BY RX/DR IN RCRD: CPT | Mod: CPTII,S$GLB,, | Performed by: NURSE PRACTITIONER

## 2024-11-26 PROCEDURE — 1159F MED LIST DOCD IN RCRD: CPT | Mod: CPTII,S$GLB,, | Performed by: NURSE PRACTITIONER

## 2024-11-26 PROCEDURE — 3288F FALL RISK ASSESSMENT DOCD: CPT | Mod: CPTII,S$GLB,, | Performed by: NURSE PRACTITIONER

## 2024-11-26 PROCEDURE — 99215 OFFICE O/P EST HI 40 MIN: CPT | Mod: S$GLB,,, | Performed by: NURSE PRACTITIONER

## 2024-11-26 PROCEDURE — 1101F PT FALLS ASSESS-DOCD LE1/YR: CPT | Mod: CPTII,S$GLB,, | Performed by: NURSE PRACTITIONER

## 2024-11-26 PROCEDURE — 99999 PR PBB SHADOW E&M-EST. PATIENT-LVL III: CPT | Mod: PBBFAC,,, | Performed by: NURSE PRACTITIONER

## 2024-11-26 NOTE — PROGRESS NOTES
Neurology Follow up Note    Subjective:         Patient ID: Juana Xavier is a 89 y.o. female.    Chief Complaint: F/u Parkinsons Disease    HPI:            Reports tremors under most of the times.      States her gait is getting a little worse, especially when tires.     Her worse tremors are in her jaw.     Some trouble swallowing and she is having progressively more difficulty with getting words out     Denies any hallucinations or wild dreams.      Feels like her meds are helping her.      Sleeping most of the night, but once awake for bathroom can't go back to sleep.     Doesn't drive;  Lives at home alone. Sister lives next door.      CD/LD 1 tab QID     ROS: as per HPI, otherwise pertinent systems review is negative          Past Medical History:   Diagnosis Date    Breast cancer     HTN (hypertension)        Past Surgical History:   Procedure Laterality Date    BREAST LUMPECTOMY Right     HIP REPLACEMENT ARTHROPLASTY Left     TONSILLECTOMY         Family History   Problem Relation Name Age of Onset    Heart disease Mother      Heart disease Father         Social History     Socioeconomic History    Marital status:    Tobacco Use    Smoking status: Never    Smokeless tobacco: Never   Substance and Sexual Activity    Alcohol use: Not Currently   Social History Narrative    ** Merged History Encounter **            Review of patient's allergies indicates:  No Known Allergies    Current Outpatient Medications   Medication Instructions    amLODIPine (NORVASC) 5 mg, Every morning    aspirin (ECOTRIN) 81 mg, Every morning    atorvastatin (LIPITOR) 10 mg, Every morning    benazepriL (LOTENSIN) 20 mg, Every morning    calcium carbonate/vitamin D3 (CALTRATE 600 + D ORAL) 1 tablet, Every morning    carbidopa-levodopa  mg (SINEMET)  mg per tablet 1 tablet, Oral, 4 times daily    co-enzyme Q-10 10 mg, Every morning    furosemide (LASIX) 20 mg, Every morning    multivitamin (ONE DAILY  "MULTIVITAMIN) per tablet 1 tablet, Daily    nabumetone (RELAFEN) 750 mg, 2 times daily    vitamin D (VITAMIN D3) 1,000 Units, Every morning    vitamin E 600 Units, Every morning       Objective:      Exam:   Visit Vitals  BP (!) 150/88 (BP Location: Left arm, Patient Position: Sitting)   Ht 5' 7" (1.702 m)   Wt 79.4 kg (175 lb)   BMI 27.41 kg/m²       Physical Exam  Vitals reviewed.   Constitutional:       Appearance: Parkinsonian; masked face and decreased eye blink     Accompanied by: two sisters  HENT:      Ears:      Comments: Hearing normal.  Eyes:      Extraocular Movements: Extraocular movements intact.      VF's nml  Cardiovascular:      Rate and Rhythm: Normal rate and regular rhythm. Systolic murmur   Pulmonary:      Effort: Pulmonary effort is normal.      Breath sounds: Normal breath sounds.   Musculoskeletal:         General: Normal range of motion.      1+ B pretibial edema  Skin:     General: Skin is warm and dry.   Neurological:      General: No focal deficit present.      Mental Status: she is alert and oriented to person, place, and time.      Speech: monotoned and hypophonia, slightly dysarthric      Face: symmetric     Motor: nonlateralizing      Coordination: F to N bradykinetic      Tone: B cogwheel rigidity; R>L     Tremor: RUE rest tremor     Gait : rollator; parkinsonian  Impaired RAE R>L  Psychiatric:         Mood and Affect: Mood normal.         Behavior: Behavior normal.         Assessment/Plan:   1. Parkinson's disease without dyskinesia or fluctuating manifestations (Primary)  Continue CD/LD 1 tab QID; considered increase to 1.5 tab every other dose alt with 1 tab     Reminded patient/family about potential PD med side effects, which include but not limited to impulse control disorders (ex: pathologic gambling, shopping, or preoccupation with sexual thoughts or behaviors), excessive daytime sleepiness, hallucinations and sleep attacks. Discussed that driving may pose an increased risk " to patients on these medications. Hypotension is also occasionally associated with Parkinson's medications. Any of these complications should be reported to the prescribing physician or provider so that appropriate further modifications can occur.    Fall precautions discussed    Refer to  for ST    She has gotten an alert button      Follow up in about 4 months (around 3/26/2025).      Don Nieto, MSN, APRN, AGACNP-BC

## 2025-03-18 ENCOUNTER — HOSPITAL ENCOUNTER (EMERGENCY)
Facility: HOSPITAL | Age: 89
Discharge: ANOTHER HOSPITAL | End: 2025-03-18
Attending: FAMILY MEDICINE
Payer: MEDICARE

## 2025-03-18 ENCOUNTER — HOSPITAL ENCOUNTER (INPATIENT)
Facility: HOSPITAL | Age: 89
LOS: 2 days | Discharge: HOME | DRG: 392 | End: 2025-03-20
Attending: INTERNAL MEDICINE | Admitting: INTERNAL MEDICINE
Payer: MEDICARE

## 2025-03-18 VITALS
BODY MASS INDEX: 27.47 KG/M2 | DIASTOLIC BLOOD PRESSURE: 83 MMHG | SYSTOLIC BLOOD PRESSURE: 122 MMHG | OXYGEN SATURATION: 95 % | WEIGHT: 175 LBS | RESPIRATION RATE: 18 BRPM | TEMPERATURE: 98 F | HEART RATE: 80 BPM | HEIGHT: 67 IN

## 2025-03-18 DIAGNOSIS — I49.9 ARRHYTHMIA: ICD-10-CM

## 2025-03-18 DIAGNOSIS — K22.2 ACUTE ESOPHAGEAL OBSTRUCTION: ICD-10-CM

## 2025-03-18 DIAGNOSIS — T18.128A FOOD IMPACTION OF ESOPHAGUS, INITIAL ENCOUNTER: Primary | ICD-10-CM

## 2025-03-18 DIAGNOSIS — R07.9 CHEST PAIN: ICD-10-CM
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
## 2025-03-18 DIAGNOSIS — G20.A1 PARKINSON'S DISEASE WITHOUT DYSKINESIA OR FLUCTUATING MANIFESTATIONS: Primary | ICD-10-CM

## 2025-03-18 LAB
ALBUMIN SERPL-MCNC: 4 G/DL (ref 3.4–4.8)
ALBUMIN/GLOB SERPL: 1.3 RATIO (ref 1.1–2)
ALP SERPL-CCNC: 98 UNIT/L (ref 40–150)
ALT SERPL-CCNC: 5 UNIT/L (ref 0–55)
ANION GAP SERPL CALC-SCNC: 12 MEQ/L
AST SERPL-CCNC: 18 UNIT/L (ref 5–34)
BASOPHILS # BLD AUTO: 0.02 X10(3)/MCL
BASOPHILS NFR BLD AUTO: 0.2 %
BILIRUB SERPL-MCNC: 0.5 MG/DL
BUN SERPL-MCNC: 19 MG/DL (ref 9.8–20.1)
CALCIUM SERPL-MCNC: 9.9 MG/DL (ref 8.4–10.2)
CHLORIDE SERPL-SCNC: 107 MMOL/L (ref 98–107)
CO2 SERPL-SCNC: 25 MMOL/L (ref 23–31)
CREAT SERPL-MCNC: 0.71 MG/DL (ref 0.55–1.02)
CREAT/UREA NIT SERPL: 27
EOSINOPHIL # BLD AUTO: 0.03 X10(3)/MCL (ref 0–0.9)
EOSINOPHIL NFR BLD AUTO: 0.3 %
ERYTHROCYTE [DISTWIDTH] IN BLOOD BY AUTOMATED COUNT: 12.4 % (ref 11.5–17)
GFR SERPLBLD CREATININE-BSD FMLA CKD-EPI: >60 ML/MIN/1.73/M2
GLOBULIN SER-MCNC: 3.1 GM/DL (ref 2.4–3.5)
GLUCOSE SERPL-MCNC: 105 MG/DL (ref 82–115)
HCT VFR BLD AUTO: 38.3 % (ref 37–47)
HGB BLD-MCNC: 12.4 G/DL (ref 12–16)
IMM GRANULOCYTES # BLD AUTO: 0.02 X10(3)/MCL (ref 0–0.04)
IMM GRANULOCYTES NFR BLD AUTO: 0.2 %
LYMPHOCYTES # BLD AUTO: 0.95 X10(3)/MCL (ref 0.6–4.6)
LYMPHOCYTES NFR BLD AUTO: 10.5 %
MCH RBC QN AUTO: 29.8 PG (ref 27–31)
MCHC RBC AUTO-ENTMCNC: 32.4 G/DL (ref 33–36)
MCV RBC AUTO: 92.1 FL (ref 80–94)
MONOCYTES # BLD AUTO: 0.55 X10(3)/MCL (ref 0.1–1.3)
MONOCYTES NFR BLD AUTO: 6.1 %
NEUTROPHILS # BLD AUTO: 7.46 X10(3)/MCL (ref 2.1–9.2)
NEUTROPHILS NFR BLD AUTO: 82.7 %
NRBC BLD AUTO-RTO: 0 %
PLATELET # BLD AUTO: 235 X10(3)/MCL (ref 130–400)
PMV BLD AUTO: 11.2 FL (ref 7.4–10.4)
POTASSIUM SERPL-SCNC: 4.1 MMOL/L (ref 3.5–5.1)
PROT SERPL-MCNC: 7.1 GM/DL (ref 5.8–7.6)
RBC # BLD AUTO: 4.16 X10(6)/MCL (ref 4.2–5.4)
SODIUM SERPL-SCNC: 144 MMOL/L (ref 136–145)
WBC # BLD AUTO: 9.03 X10(3)/MCL (ref 4.5–11.5)

## 2025-03-18 PROCEDURE — 25000003 PHARM REV CODE 250: Performed by: FAMILY MEDICINE

## 2025-03-18 PROCEDURE — 85025 COMPLETE CBC W/AUTO DIFF WBC: CPT | Performed by: FAMILY MEDICINE

## 2025-03-18 PROCEDURE — 80053 COMPREHEN METABOLIC PANEL: CPT | Performed by: FAMILY MEDICINE

## 2025-03-18 PROCEDURE — 99285 EMERGENCY DEPT VISIT HI MDM: CPT | Mod: 25

## 2025-03-18 PROCEDURE — 11000001 HC ACUTE MED/SURG PRIVATE ROOM

## 2025-03-18 RX ORDER — SODIUM CHLORIDE 0.9 % (FLUSH) 0.9 %
10 SYRINGE (ML) INJECTION
Status: DISCONTINUED | OUTPATIENT
Start: 2025-03-18 | End: 2025-03-20 | Stop reason: HOSPADM

## 2025-03-18 RX ORDER — TALC
6 POWDER (GRAM) TOPICAL NIGHTLY PRN
Status: DISCONTINUED | OUTPATIENT
Start: 2025-03-18 | End: 2025-03-20 | Stop reason: HOSPADM

## 2025-03-18 RX ORDER — GLUCAGON 1 MG
1 KIT INJECTION
Status: DISCONTINUED | OUTPATIENT
Start: 2025-03-18 | End: 2025-03-20 | Stop reason: HOSPADM

## 2025-03-18 RX ORDER — BISACODYL 10 MG/1
10 SUPPOSITORY RECTAL DAILY PRN
Status: DISCONTINUED | OUTPATIENT
Start: 2025-03-18 | End: 2025-03-20 | Stop reason: HOSPADM

## 2025-03-18 RX ORDER — ALUMINUM HYDROXIDE, MAGNESIUM HYDROXIDE, AND SIMETHICONE 1200; 120; 1200 MG/30ML; MG/30ML; MG/30ML
30 SUSPENSION ORAL 4 TIMES DAILY PRN
Status: DISCONTINUED | OUTPATIENT
Start: 2025-03-18 | End: 2025-03-20 | Stop reason: HOSPADM

## 2025-03-18 RX ORDER — AMOXICILLIN 250 MG
1 CAPSULE ORAL 2 TIMES DAILY PRN
Status: DISCONTINUED | OUTPATIENT
Start: 2025-03-18 | End: 2025-03-20 | Stop reason: HOSPADM

## 2025-03-18 RX ORDER — IBUPROFEN 200 MG
24 TABLET ORAL
Status: DISCONTINUED | OUTPATIENT
Start: 2025-03-18 | End: 2025-03-20 | Stop reason: HOSPADM

## 2025-03-18 RX ORDER — NALOXONE HCL 0.4 MG/ML
0.02 VIAL (ML) INJECTION
Status: DISCONTINUED | OUTPATIENT
Start: 2025-03-18 | End: 2025-03-20 | Stop reason: HOSPADM

## 2025-03-18 RX ORDER — ONDANSETRON HYDROCHLORIDE 2 MG/ML
4 INJECTION, SOLUTION INTRAVENOUS EVERY 4 HOURS PRN
Status: DISCONTINUED | OUTPATIENT
Start: 2025-03-18 | End: 2025-03-20 | Stop reason: HOSPADM

## 2025-03-18 RX ORDER — IBUPROFEN 200 MG
16 TABLET ORAL
Status: DISCONTINUED | OUTPATIENT
Start: 2025-03-18 | End: 2025-03-20 | Stop reason: HOSPADM

## 2025-03-18 RX ORDER — ACETAMINOPHEN 500 MG
1000 TABLET ORAL EVERY 6 HOURS PRN
Status: DISCONTINUED | OUTPATIENT
Start: 2025-03-18 | End: 2025-03-20 | Stop reason: HOSPADM

## 2025-03-18 RX ADMIN — SODIUM CHLORIDE 500 ML: 9 INJECTION, SOLUTION INTRAVENOUS at 11:03

## 2025-03-18 NOTE — ED PROVIDER NOTES
Encounter Date: 3/18/2025       History     Chief Complaint   Patient presents with    Dysphagia     Trouble swallowing since last night, trouble eating. Also reports drooling and difficulty taking medications. Hx parkinsons.       This patient is an 89-year-old female who has been having trouble swallowing since last night.  Patient states that she ate some chicken breast yesterday for lunch and she states that she felt that some of it stayed in her esophagus.  She stopped eating and says says that she actually threw up but still could feel an uncomfortable feeling that has not gone away.  Every time she tries to drink water it will stay down for few minutes and then she will bring it back up.  Has not been able to take her medicines    The history is provided by the patient.     Review of patient's allergies indicates:  No Known Allergies  Past Medical History:   Diagnosis Date    Breast cancer     HTN (hypertension)      Past Surgical History:   Procedure Laterality Date    BREAST LUMPECTOMY Right     HIP REPLACEMENT ARTHROPLASTY Left     TONSILLECTOMY       Family History   Problem Relation Name Age of Onset    Heart disease Mother      Heart disease Father       Social History[1]  Review of Systems   Constitutional: Negative.    HENT: Negative.          Dysphagia   Respiratory: Negative.     Cardiovascular: Negative.    Endocrine: Negative.    Musculoskeletal: Negative.    Neurological: Negative.    Psychiatric/Behavioral: Negative.     All other systems reviewed and are negative.      Physical Exam     Initial Vitals [03/18/25 1052]   BP Pulse Resp Temp SpO2   133/81 105 18 97.3 °F (36.3 °C) 95 %      MAP       --         Physical Exam    Nursing note and vitals reviewed.  Constitutional: She appears well-developed.   HENT:   Head: Normocephalic.   Eyes: Pupils are equal, round, and reactive to light.   Neck:   Normal range of motion.  Cardiovascular:  Regular rhythm and normal heart sounds.   Tachycardia  present.         Pulmonary/Chest: Breath sounds normal.     Patient describes an uncomfortable feeling in this area but it is not pain.   Abdominal: Abdomen is soft.   Musculoskeletal:         General: Normal range of motion.      Cervical back: Normal range of motion.     Neurological: She is alert and oriented to person, place, and time.   Skin: Skin is warm and dry.   Psychiatric: She has a normal mood and affect.         ED Course   Procedures  Labs Reviewed   CBC WITH DIFFERENTIAL - Abnormal       Result Value    WBC 9.03      RBC 4.16 (*)     Hgb 12.4      Hct 38.3      MCV 92.1      MCH 29.8      MCHC 32.4 (*)     RDW 12.4      Platelet 235      MPV 11.2 (*)     Neut % 82.7      Lymph % 10.5      Mono % 6.1      Eos % 0.3      Basophil % 0.2      Imm Grans % 0.2      Neut # 7.46      Lymph # 0.95      Mono # 0.55      Eos # 0.03      Baso # 0.02      Imm Gran # 0.02      NRBC% 0.0     CBC W/ AUTO DIFFERENTIAL    Narrative:     The following orders were created for panel order CBC auto differential.  Procedure                               Abnormality         Status                     ---------                               -----------         ------                     CBC with Differential[[id number]]       Abnormal            Final result                 Please view results for these tests on the individual orders.   COMPREHENSIVE METABOLIC PANEL    Sodium 144      Potassium 4.1      Chloride 107      CO2 25      Glucose 105      Blood Urea Nitrogen 19.0      Creatinine 0.71      Calcium 9.9      Protein Total 7.1      Albumin 4.0      Globulin 3.1      Albumin/Globulin Ratio 1.3      Bilirubin Total 0.5      ALP 98      ALT 5      AST 18      eGFR >60      Anion Gap 12.0      BUN/Creatinine Ratio 27            Imaging Results              CT Soft Tissue Neck WO Contrast (Final result)  Result time 03/18/25 12:13:26      Final result by Jessie Walden MD (03/18/25 12:13:26)                    Impression:      1. No radiopaque foreign body or fluid bolus.  2. Fluid layering in the thoracic esophagus.      Electronically signed by: Jessie Walden  Date:    03/18/2025  Time:    12:13               Narrative:    EXAMINATION:  CT SOFT TISSUE NECK WITHOUT CONTRAST    CLINICAL HISTORY:  obstruction;    TECHNIQUE:  Axial CT images of the neck were obtained without intravenous contrast. Reformatted images in the sagittal and coronal plane were included.    Automatic exposure control was utilized to limit radiation dose.    DLP: 281 mGy-cm    COMPARISON:  None.    FINDINGS:  The airways are patent.  There is no radiopaque foreign body or fluid bolus identified.  There is fluid layering in the thoracic esophagus.  There are no abnormally enlarged cervical lymph nodes.  The parotid glands and submandibular and unremarkable.  There are multiple subcentimeter thyroid nodules.  There are mild atherosclerotic calcifications the carotid bulbs.  There is no acute abnormality of the orbits or visualized brain parenchyma.  There is trace scattered paranasal sinus mucosal thickening.  There is no destructive bone lesion.  The lung apices are clear.                                       Medications   sodium chloride 0.9% bolus 500 mL 500 mL (500 mLs Intravenous New Bag 3/18/25 1128)     Medical Decision Making  This patient is an 89-year-old female who was having difficulty swallowing.  Patient has a history of Parkinson's and has been unable to take her medicine today.  She has already missed 2 doses.  States it has been going on since last night after eating a piece of chicken breasts  Differential diagnosis:  Obstruction of esophagus secondary to fluid bolus    Amount and/or Complexity of Data Reviewed  Labs: ordered.     Details: Patient's lab work shows normal CMP and normal CBC  Radiology: ordered.     Details: CT soft tissue neck without contrast shows no radiopaque foreign body or fluid bolus.  But it also shows  fluid layering in the thoracic esophagus where patient is feeling the food bolus                                      Clinical Impression:  Final diagnoses:  [T18.128A, [id number].F3XA] Food impaction of esophagus, initial encounter (Primary)          ED Disposition Condition    Transfer to Another Facility Stable                  Jarad Segura MD  03/18/25 1417         [1]   Social History  Tobacco Use    Smoking status: Never    Smokeless tobacco: Never   Substance Use Topics    Alcohol use: Not Currently        Jarad Segura MD  03/18/25 1077

## 2025-03-19 ENCOUNTER — ANESTHESIA (OUTPATIENT)
Dept: SURGERY | Facility: HOSPITAL | Age: 89
End: 2025-03-19
Payer: MEDICARE

## 2025-03-19 ENCOUNTER — ANESTHESIA EVENT (OUTPATIENT)
Dept: SURGERY | Facility: HOSPITAL | Age: 89
End: 2025-03-19
Payer: MEDICARE

## 2025-03-19 LAB
ALBUMIN SERPL-MCNC: 3.7 G/DL (ref 3.4–4.8)
ALBUMIN/GLOB SERPL: 1.4 RATIO (ref 1.1–2)
ALP SERPL-CCNC: 102 UNIT/L (ref 40–150)
ALT SERPL-CCNC: 12 UNIT/L (ref 0–55)
ANION GAP SERPL CALC-SCNC: 13 MEQ/L
AST SERPL-CCNC: 19 UNIT/L (ref 5–34)
BASOPHILS # BLD AUTO: 0.03 X10(3)/MCL
BASOPHILS NFR BLD AUTO: 0.3 %
BILIRUB SERPL-MCNC: 0.5 MG/DL
BUN SERPL-MCNC: 16 MG/DL (ref 9.8–20.1)
CALCIUM SERPL-MCNC: 9.4 MG/DL (ref 8.4–10.2)
CHLORIDE SERPL-SCNC: 107 MMOL/L (ref 98–107)
CO2 SERPL-SCNC: 22 MMOL/L (ref 23–31)
CREAT SERPL-MCNC: 0.64 MG/DL (ref 0.55–1.02)
CREAT/UREA NIT SERPL: 25
EOSINOPHIL # BLD AUTO: 0.05 X10(3)/MCL (ref 0–0.9)
EOSINOPHIL NFR BLD AUTO: 0.5 %
ERYTHROCYTE [DISTWIDTH] IN BLOOD BY AUTOMATED COUNT: 12.5 % (ref 11.5–17)
GFR SERPLBLD CREATININE-BSD FMLA CKD-EPI: >60 ML/MIN/1.73/M2
GLOBULIN SER-MCNC: 2.6 GM/DL (ref 2.4–3.5)
GLUCOSE SERPL-MCNC: 68 MG/DL (ref 82–115)
HCT VFR BLD AUTO: 38.9 % (ref 37–47)
HGB BLD-MCNC: 12.6 G/DL (ref 12–16)
IMM GRANULOCYTES # BLD AUTO: 0.04 X10(3)/MCL (ref 0–0.04)
IMM GRANULOCYTES NFR BLD AUTO: 0.4 %
LYMPHOCYTES # BLD AUTO: 0.9 X10(3)/MCL (ref 0.6–4.6)
LYMPHOCYTES NFR BLD AUTO: 8.9 %
MAGNESIUM SERPL-MCNC: 2 MG/DL (ref 1.6–2.6)
MCH RBC QN AUTO: 30.2 PG (ref 27–31)
MCHC RBC AUTO-ENTMCNC: 32.4 G/DL (ref 33–36)
MCV RBC AUTO: 93.3 FL (ref 80–94)
MONOCYTES # BLD AUTO: 0.54 X10(3)/MCL (ref 0.1–1.3)
MONOCYTES NFR BLD AUTO: 5.4 %
NEUTROPHILS # BLD AUTO: 8.52 X10(3)/MCL (ref 2.1–9.2)
NEUTROPHILS NFR BLD AUTO: 84.5 %
NRBC BLD AUTO-RTO: 0 %
OHS QRS DURATION: 110 MS
OHS QRS DURATION: 96 MS
OHS QTC CALCULATION: 437 MS
OHS QTC CALCULATION: 448 MS
PLATELET # BLD AUTO: 221 X10(3)/MCL (ref 130–400)
PMV BLD AUTO: 11.7 FL (ref 7.4–10.4)
POCT GLUCOSE: 70 MG/DL (ref 70–110)
POTASSIUM SERPL-SCNC: 3.6 MMOL/L (ref 3.5–5.1)
PROT SERPL-MCNC: 6.3 GM/DL (ref 5.8–7.6)
RBC # BLD AUTO: 4.17 X10(6)/MCL (ref 4.2–5.4)
SODIUM SERPL-SCNC: 142 MMOL/L (ref 136–145)
WBC # BLD AUTO: 10.08 X10(3)/MCL (ref 4.5–11.5)

## 2025-03-19 PROCEDURE — 93005 ELECTROCARDIOGRAM TRACING: CPT

## 2025-03-19 PROCEDURE — 25000003 PHARM REV CODE 250

## 2025-03-19 PROCEDURE — 63600175 PHARM REV CODE 636 W HCPCS: Performed by: STUDENT IN AN ORGANIZED HEALTH CARE EDUCATION/TRAINING PROGRAM

## 2025-03-19 PROCEDURE — 85025 COMPLETE CBC W/AUTO DIFF WBC: CPT | Performed by: NURSE PRACTITIONER

## 2025-03-19 PROCEDURE — 11000001 HC ACUTE MED/SURG PRIVATE ROOM

## 2025-03-19 PROCEDURE — 0DJ08ZZ INSPECTION OF UPPER INTESTINAL TRACT, VIA NATURAL OR ARTIFICIAL OPENING ENDOSCOPIC: ICD-10-PCS | Performed by: INTERNAL MEDICINE

## 2025-03-19 PROCEDURE — 25000003 PHARM REV CODE 250: Performed by: INTERNAL MEDICINE

## 2025-03-19 PROCEDURE — 37000009 HC ANESTHESIA EA ADD 15 MINS: Performed by: INTERNAL MEDICINE

## 2025-03-19 PROCEDURE — 83735 ASSAY OF MAGNESIUM: CPT | Performed by: NURSE PRACTITIONER

## 2025-03-19 PROCEDURE — 93010 ELECTROCARDIOGRAM REPORT: CPT | Mod: ,,, | Performed by: INTERNAL MEDICINE

## 2025-03-19 PROCEDURE — 63600175 PHARM REV CODE 636 W HCPCS

## 2025-03-19 PROCEDURE — 80053 COMPREHEN METABOLIC PANEL: CPT | Performed by: NURSE PRACTITIONER

## 2025-03-19 PROCEDURE — 43235 EGD DIAGNOSTIC BRUSH WASH: CPT | Performed by: INTERNAL MEDICINE

## 2025-03-19 PROCEDURE — 93010 ELECTROCARDIOGRAM REPORT: CPT | Mod: 76,,, | Performed by: INTERNAL MEDICINE

## 2025-03-19 PROCEDURE — 37000008 HC ANESTHESIA 1ST 15 MINUTES: Performed by: INTERNAL MEDICINE

## 2025-03-19 PROCEDURE — 36415 COLL VENOUS BLD VENIPUNCTURE: CPT | Performed by: NURSE PRACTITIONER

## 2025-03-19 PROCEDURE — 63600175 PHARM REV CODE 636 W HCPCS: Performed by: INTERNAL MEDICINE

## 2025-03-19 RX ORDER — ONDANSETRON HYDROCHLORIDE 2 MG/ML
INJECTION, SOLUTION INTRAVENOUS
Status: DISCONTINUED | OUTPATIENT
Start: 2025-03-19 | End: 2025-03-19

## 2025-03-19 RX ORDER — FENTANYL CITRATE 50 UG/ML
INJECTION, SOLUTION INTRAMUSCULAR; INTRAVENOUS
Status: DISCONTINUED | OUTPATIENT
Start: 2025-03-19 | End: 2025-03-19

## 2025-03-19 RX ORDER — SUCCINYLCHOLINE CHLORIDE 20 MG/ML
INJECTION INTRAMUSCULAR; INTRAVENOUS
Status: DISCONTINUED | OUTPATIENT
Start: 2025-03-19 | End: 2025-03-19

## 2025-03-19 RX ORDER — OXYCODONE HYDROCHLORIDE 5 MG/1
5 TABLET ORAL
Refills: 0 | Status: CANCELLED | OUTPATIENT
Start: 2025-03-19

## 2025-03-19 RX ORDER — ROCURONIUM BROMIDE 10 MG/ML
INJECTION, SOLUTION INTRAVENOUS
Status: DISCONTINUED | OUTPATIENT
Start: 2025-03-19 | End: 2025-03-19

## 2025-03-19 RX ORDER — SODIUM CHLORIDE 9 MG/ML
INJECTION, SOLUTION INTRAVENOUS CONTINUOUS
Status: DISCONTINUED | OUTPATIENT
Start: 2025-03-19 | End: 2025-03-19

## 2025-03-19 RX ORDER — DEXAMETHASONE SODIUM PHOSPHATE 4 MG/ML
INJECTION, SOLUTION INTRA-ARTICULAR; INTRALESIONAL; INTRAMUSCULAR; INTRAVENOUS; SOFT TISSUE
Status: DISCONTINUED | OUTPATIENT
Start: 2025-03-19 | End: 2025-03-19

## 2025-03-19 RX ORDER — HYDROMORPHONE HYDROCHLORIDE 2 MG/ML
0.2 INJECTION, SOLUTION INTRAMUSCULAR; INTRAVENOUS; SUBCUTANEOUS EVERY 5 MIN PRN
Refills: 0 | Status: CANCELLED | OUTPATIENT
Start: 2025-03-19

## 2025-03-19 RX ORDER — PROPOFOL 10 MG/ML
VIAL (ML) INTRAVENOUS
Status: DISCONTINUED | OUTPATIENT
Start: 2025-03-19 | End: 2025-03-19

## 2025-03-19 RX ORDER — LIDOCAINE HYDROCHLORIDE 20 MG/ML
INJECTION, SOLUTION EPIDURAL; INFILTRATION; INTRACAUDAL; PERINEURAL
Status: DISCONTINUED | OUTPATIENT
Start: 2025-03-19 | End: 2025-03-19

## 2025-03-19 RX ORDER — ONDANSETRON HYDROCHLORIDE 2 MG/ML
4 INJECTION, SOLUTION INTRAVENOUS DAILY PRN
Status: CANCELLED | OUTPATIENT
Start: 2025-03-19

## 2025-03-19 RX ORDER — GLUCAGON 1 MG
1 KIT INJECTION
Status: CANCELLED | OUTPATIENT
Start: 2025-03-19

## 2025-03-19 RX ORDER — PHENYLEPHRINE HYDROCHLORIDE 10 MG/ML
INJECTION INTRAVENOUS
Status: DISCONTINUED | OUTPATIENT
Start: 2025-03-19 | End: 2025-03-19

## 2025-03-19 RX ORDER — DEXTROSE MONOHYDRATE AND SODIUM CHLORIDE 5; .9 G/100ML; G/100ML
INJECTION, SOLUTION INTRAVENOUS CONTINUOUS
Status: DISCONTINUED | OUTPATIENT
Start: 2025-03-19 | End: 2025-03-20 | Stop reason: HOSPADM

## 2025-03-19 RX ORDER — DIPHENHYDRAMINE HYDROCHLORIDE 50 MG/ML
25 INJECTION, SOLUTION INTRAMUSCULAR; INTRAVENOUS EVERY 6 HOURS PRN
Status: CANCELLED | OUTPATIENT
Start: 2025-03-19

## 2025-03-19 RX ADMIN — SUCCINYLCHOLINE CHLORIDE 120 MG: 20 INJECTION, SOLUTION INTRAMUSCULAR; INTRAVENOUS at 02:03

## 2025-03-19 RX ADMIN — AMPICILLIN SODIUM 1 G: 1 INJECTION, POWDER, FOR SOLUTION INTRAMUSCULAR; INTRAVENOUS at 01:03

## 2025-03-19 RX ADMIN — ROCURONIUM BROMIDE 10 MG: 10 SOLUTION INTRAVENOUS at 02:03

## 2025-03-19 RX ADMIN — DEXTROSE AND SODIUM CHLORIDE: 5; 900 INJECTION, SOLUTION INTRAVENOUS at 08:03

## 2025-03-19 RX ADMIN — LIDOCAINE HYDROCHLORIDE 60 MG: 20 INJECTION, SOLUTION INTRAVENOUS at 02:03

## 2025-03-19 RX ADMIN — DEXAMETHASONE SODIUM PHOSPHATE 4 MG: 4 INJECTION, SOLUTION INTRA-ARTICULAR; INTRALESIONAL; INTRAMUSCULAR; INTRAVENOUS; SOFT TISSUE at 02:03

## 2025-03-19 RX ADMIN — PROPOFOL 80 MG: 10 INJECTION, EMULSION INTRAVENOUS at 02:03

## 2025-03-19 RX ADMIN — ONDANSETRON 4 MG: 2 INJECTION INTRAMUSCULAR; INTRAVENOUS at 02:03

## 2025-03-19 RX ADMIN — SODIUM CHLORIDE: 9 INJECTION, SOLUTION INTRAVENOUS at 02:03

## 2025-03-19 RX ADMIN — PHENYLEPHRINE HYDROCHLORIDE 200 MCG: 10 INJECTION INTRAVENOUS at 02:03

## 2025-03-19 RX ADMIN — FENTANYL CITRATE 100 MCG: 50 INJECTION, SOLUTION INTRAMUSCULAR; INTRAVENOUS at 02:03

## 2025-03-19 NOTE — PT/OT/SLP PROGRESS
Physical Therapy Treatment    Patient Name:  Juana Xavier   MRN:  [id number]    Patient going to GI lab upon attempt. PT to f/u tomorrow.

## 2025-03-19 NOTE — ANESTHESIA PREPROCEDURE EVALUATION
03/19/2025  Juana Xavier is a 89 y.o., female.    Juana Xavier is a 89 y.o. female with a past medical history of hypertension, hyperlipidemia, breast cancer, and Parkinson's disease who presented to [facility] on 3/18/2025 transferred from [facility] ED for GI services. Patient presented to outside facility for dysphagia. Patient reported she ate chicken breast for lunch on 03/17/2025 and felt like it stayed stuck in her throat. Patient reported vomiting and was unable to even tolerate water. Patient reported discomfort in her throat and that she had not been able to take any of her medicines, prompting her to outside ED. CBC revealed WBC of 9.03. CMP was unremarkable. CT soft tissue neck without contrast revealed no radiopaque foreign body or fluid bolus with fluid layering in the thoracic esophagus. Patient had continued dysphasia in ED was concerned with possible food bolus secondary to fluid layering in the thoracic esophagus, therefore patient was transferred to [facility] for GI services. Patient was admitted to hospital medicine service for further medical management. On exam patient reported continued dysphagia with spitting up some secretions.     Past Medical History:   Diagnosis Date    Breast cancer     HTN (hypertension)      Past Surgical History:   Procedure Laterality Date    BREAST LUMPECTOMY Right     HIP REPLACEMENT ARTHROPLASTY Left     TONSILLECTOMY       Medications Ordered Prior to Encounter[1]                  Pre-op Assessment    I have reviewed the Patient Summary Reports.     I have reviewed the Nursing Notes. I have reviewed the NPO Status.   I have reviewed the Medications.     Review of Systems  Hematology/Oncology:                        --  Cancer in past history:       Breast              Cardiovascular:     Hypertension                                           Hepatic/GI:        Suspected food impaction             Neurological:  TIA,         She had a workup last year for very minimal symptoms (possible TIA), including Carotid US and EEG - nothing positive.                                Physical Exam  General: Cooperative, Alert and Oriented    Airway:  Mallampati: III   Mouth Opening: Small, but > 3cm  TM Distance: Normal  Tongue: Normal  Neck ROM: Extension Decreased    Dental:  Intact        Anesthesia Plan  Type of Anesthesia, risks & benefits discussed:    Anesthesia Type: Gen ETT  Intra-op Monitoring Plan: Standard ASA Monitors  Post Op Pain Control Plan: multimodal analgesia  Induction:  rapid sequence and IV  Airway Plan: Direct and Video, Post-Induction  Informed Consent: Informed consent signed with the Patient and all parties understand the risks and agree with anesthesia plan.  All questions answered.   ASA Score: 3  Day of Surgery Review of History & Physical: H&P Update referred to the surgeon/provider.I have interviewed and examined the patient. I have reviewed the patient's H&P dated: There are no significant changes.     Ready For Surgery From Anesthesia Perspective.     .           [1]   No current facility-administered medications on file prior to encounter.     Current Outpatient Medications on File Prior to Encounter   Medication Sig Dispense Refill    amLODIPine (NORVASC) 5 MG tablet Take 5 mg by mouth every morning.      atorvastatin (LIPITOR) 10 MG tablet Take 10 mg by mouth every morning.      benazepriL (LOTENSIN) 20 MG tablet Take 20 mg by mouth every morning.      carbidopa-levodopa  mg (SINEMET)  mg per tablet Take 1 tablet by mouth 4 (four) times daily. 360 tablet 3    furosemide (LASIX) 20 MG tablet Take 20 mg by mouth every morning.      nabumetone (RELAFEN) 750 MG tablet Take 750 mg by mouth 2 (two) times daily.      aspirin (ECOTRIN) 81 MG EC tablet Take 81 mg by mouth every morning.      calcium carbonate/vitamin D3  (CALTRATE 600 + D ORAL) Take 1 tablet by mouth every morning.      co-enzyme Q-10 30 mg capsule Take 10 mg by mouth every morning.      multivitamin (ONE DAILY MULTIVITAMIN) per tablet Take 1 tablet by mouth once daily.      vitamin D (VITAMIN D3) 1000 units Tab Take 1,000 Units by mouth every morning.      vitamin E 600 UNIT capsule Take 600 Units by mouth every morning.

## 2025-03-19 NOTE — CONSULTS
Juana Xavier   MRN: [id number]   ADMISSION DATE: 3/18/2025  : 1935  AGE: 89 y.o.    DATE :  2025     PROVIDER: AVRIL GAMBINO    REASON FOR REFERRAL   dysphagia     SUBJECTIVE:  Juana Xavier is a 89 y.o. female with a past medical history of hypertension, hyperlipidemia, breast cancer, and Parkinson's disease who presented to [facility] on 3/18/2025 transferred from [facility] ED for GI services.  Patient presented to outside facility for dysphagia.  Patient reported she ate chicken breast for lunch on 2025 and felt like it stayed stuck in her throat.  Patient reported vomiting and was unable to even tolerate water.  Patient reported discomfort in her throat and that she had not been able to take any of her medicines, prompting her to outside ED. CBC revealed WBC of 9.03.  CMP was unremarkable.  CT soft tissue neck without contrast revealed no radiopaque foreign body or fluid bolus with fluid layering in the thoracic esophagus.  Patient had continued dysphasia in ED was concerned with possible food bolus secondary to fluid layering in the thoracic esophagus, therefore patient was transferred to [facility] for GI services. Patient was admitted to hospital medicine service for further medical management.  On exam patient reported continued dysphagia with spitting up some secretions.     Patient denies any previous EGD or colonoscopy.  She has not seen any gastroenterologist in the past.  This was consult called in this morning as a routine consult.  GI Service was not called by emergency room on initial presentation.    Review of Systems   Except as documented, all other systems reviewed and negative.     Review of patient's allergies indicates:  No Known Allergies         Medications Discontinued During This Encounter   Medication Reason    0.9% NaCl infusion          D5 and 0.9% NaCl   Intravenous Continuous 75 mL/hr at 25 0834 New Bag at 25 0834        Past Medical History:  "  Diagnosis Date    Breast cancer     HTN (hypertension)       Social History     Socioeconomic History    Marital status:    Tobacco Use    Smoking status: Never    Smokeless tobacco: Never   Substance and Sexual Activity    Alcohol use: Not Currently   Social History Narrative    ** Merged History Encounter **          Social Drivers of Health     Financial Resource Strain: Low Risk  (3/19/2025)    Overall Financial Resource Strain (CARDIA)     Difficulty of Paying Living Expenses: Not hard at all   Food Insecurity: No Food Insecurity (3/19/2025)    Hunger Vital Sign     Worried About Running Out of Food in the Last Year: Never true     Ran Out of Food in the Last Year: Never true   Stress: No Stress Concern Present (3/19/2025)    [employer] [place] [employer] - Occupational Stress Questionnaire     Feeling of Stress : Not at all   Housing Stability: Low Risk  (3/19/2025)    Housing Stability Vital Sign     Unable to Pay for Housing in the Last Year: No     Homeless in the Last Year: No      Past Surgical History:   Procedure Laterality Date    BREAST LUMPECTOMY Right     HIP REPLACEMENT ARTHROPLASTY Left     TONSILLECTOMY          Family History   Problem Relation Name Age of Onset    Heart disease Mother      Heart disease Father            OBJECTIVE:     Vitals:    03/19/25 0236 03/19/25 0400 03/19/25 0849 03/19/25 1156   BP: (!) 153/69 130/79 (!) 165/96 125/79   BP Location: Right arm      Patient Position: Lying      Pulse: 88 91 100 86   Resp: 18 17 18    Temp: 98.5 °F (36.9 °C) 98.3 °F (36.8 °C) 98.7 °F (37.1 °C) 98.3 °F (36.8 °C)   TempSrc: Oral Oral Oral Oral   SpO2: 96% 97% 95% 96%   Weight: 79.4 kg (175 lb)      Height: 5' 7" (1.702 m)            Physical Exam   General appearance: Elderly female in no apparent distress.  HENT: Atraumatic head.   Eyes: Normal extraocular movements.   Neck: Supple.   Lungs: Clear to auscultation bilaterally.   Heart: Regular rate and rhythm.  Positive " "systolic murmur, aortic area/left sternal border  Abdomen: Soft, non-distended, non-tender.  Skin: No Rash.   Neuro: Awake, alert, and oriented. Motor and sensory exams grossly intact.   Psych/mental status: Appropriate mood and affect. Responds appropriately to questions.     LABS    Recent Labs   Lab 03/18/25  1148 03/19/25  0323   WBC 9.03 10.08   HGB 12.4 12.6   HCT 38.3 38.9    221      Recent Labs   Lab 03/18/25  1148 03/19/25  0323    142   K 4.1 3.6    107   CO2 25 22*   BUN 19.0 16.0   CREATININE 0.71 0.64   CALCIUM 9.9 9.4   BILITOT 0.5 0.5   ALKPHOS 98 102   ALT 5 12   AST 18 19   GLUCOSE 105 68*    No results for input(s): "INR" in the last 168 hours. No results for input(s): "AMYLASE" in the last 168 hours. No results for input(s): "APTT", "INR", "PTT" in the last 168 hours.        RESULTS: X-Ray Chest 1 View  Result Date: 3/19/2025  EXAMINATION: XR CHEST 1 VIEW CLINICAL HISTORY: CHEST PAIN; TECHNIQUE: Frontal view(s) of the chest. COMPARISON: No relevant comparison studies available at the time of dictation. FINDINGS: Normal cardiac silhouette.  The lungs are well-inflated.  No consolidation identified.  No significant pleural effusion or discernible pneumothorax.     No acute pulmonary process identified. Electronically signed by: Mekhi Regalado Date:    03/19/2025 Time:    09:17    CT Soft Tissue Neck WO Contrast  Result Date: 3/18/2025  EXAMINATION: CT SOFT TISSUE NECK WITHOUT CONTRAST CLINICAL HISTORY: obstruction; TECHNIQUE: Axial CT images of the neck were obtained without intravenous contrast. Reformatted images in the sagittal and coronal plane were included. Automatic exposure control was utilized to limit radiation dose. DLP: 281 mGy-cm COMPARISON: None. FINDINGS: The airways are patent.  There is no radiopaque foreign body or fluid bolus identified.  There is fluid layering in the thoracic esophagus.  There are no abnormally enlarged cervical lymph nodes.  The parotid " glands and submandibular and unremarkable.  There are multiple subcentimeter thyroid nodules.  There are mild atherosclerotic calcifications the carotid bulbs.  There is no acute abnormality of the orbits or visualized brain parenchyma.  There is trace scattered paranasal sinus mucosal thickening.  There is no destructive bone lesion.  The lung apices are clear.     1. No radiopaque foreign body or fluid bolus. 2. Fluid layering in the thoracic esophagus. Electronically signed by: Jessie Walden Date:    03/18/2025 Time:    12:13             ICD-10-CM ICD-9-CM   1. Acute esophageal obstruction  K22.2 530.3   2. Chest pain  R07.9 786.50            ASSESSMENT & PLAN:   Assessment:   Dysphagia   Vomiting   Concern for possible food bolus  History of hypertension, hyperlipidemia, breast cancer, and Parkinson's disease        Plan:  NPO   CT soft tissue neck revealed no radiopaque foreign body or fluid bolus, however revealed fluid layering in the thoracic esophagus  GI consulted, appreciate recommendations  IVF   PRN antiemetics  Resume home medications as deemed appropriate once medication reconciliation is updated  Labs in AM     VTE Prophylaxis:  SCDs     Discharge Planning and Disposition: TBD     Code status in detail patient and son.  Patient elected DNR.  Code discussion also witnessed by patient's nurse RON Sena. Attending MD aware.    Recommendations:   1.  Agree with NPO status.    2. Plan for upper endoscopy today.  Procedure was discussed with the patient in details along with possible risks, complications etc. in the presence of family members.  They agree and would like to proceed.  Patient would probably need endotracheal intubation for possible food bolus obstruction.  Would defer this to anesthesia service.  Also needs antibiotic prophylaxis ampicillin 1 g IV piggyback 30 minutes before procedure.

## 2025-03-19 NOTE — TRANSFER OF CARE
"Anesthesia Transfer of Care Note    Patient: Juana Xavier    Procedure(s) Performed: Procedure(s) (LRB):  EGD (ESOPHAGOGASTRODUODENOSCOPY) (N/A)    Patient location: PACU    Anesthesia Type: general    Transport from OR: Transported from OR on room air with adequate spontaneous ventilation    Post pain: adequate analgesia    Post assessment: no apparent anesthetic complications and tolerated procedure well    Post vital signs: stable    Level of consciousness: awake, responds to stimulation and lethargic    Nausea/Vomiting: no nausea/vomiting    Complications: none    Transfer of care protocol was followed      Last vitals: Visit Vitals  BP (!) 148/81   Pulse 89   Temp 37.5 °C (99.5 °F)   Resp 16   Ht 5' 7" (1.702 m)   Wt 79.4 kg (175 lb)   SpO2 (!) 94%   BMI 27.41 kg/m²     "

## 2025-03-19 NOTE — PROGRESS NOTES
Inpatient Nutrition Evaluation    Admit Date: 3/18/2025   Total duration of encounter: 1 day    Nutrition Recommendation/Prescription     Diet Full Liquid Standard Tray ordered  Encouraged adequate PO intake  Monitor appetite/PO intake, weight, and labs    Nutrition Assessment     Chart Review    Reason Seen: continuous nutrition monitoring Esophageal Obstruction    Malnutrition Screening Tool Results   Have you recently lost weight without trying?: No  Have you been eating poorly because of a decreased appetite?: No   MST Score: 0     Diagnosis:  Dysphagia   Vomiting   Concern for possible food bolus  History of hypertension, hyperlipidemia, breast cancer, and Parkinson's disease    Relevant Medical History:    Breast cancer      HTN (hypertension)          Nutrition-Related Medications:   Scheduled Meds:  Continuous Infusions:   D5 and 0.9% NaCl   Intravenous Continuous 75 mL/hr at 03/19/25 0834 New Bag at 03/19/25 0834     PRN Meds:.  Current Facility-Administered Medications:     acetaminophen, 1,000 mg, Oral, Q6H PRN    aluminum-magnesium hydroxide-simethicone, 30 mL, Oral, QID PRN    bisacodyL, 10 mg, Rectal, Daily PRN    dextrose 50%, 12.5 g, Intravenous, PRN    dextrose 50%, 25 g, Intravenous, PRN    glucagon (human recombinant), 1 mg, Intramuscular, PRN    glucose, 16 g, Oral, PRN    glucose, 24 g, Oral, PRN    melatonin, 6 mg, Oral, Nightly PRN    naloxone, 0.02 mg, Intravenous, PRN    ondansetron, 4 mg, Intravenous, Q4H PRN    senna-docusate 8.6-50 mg, 1 tablet, Oral, BID PRN    sodium chloride 0.9%, 10 mL, Intravenous, PRN      Nutrition-Related Labs:  Recent Labs   Lab 03/18/25  1148 03/19/25  0323    142   K 4.1 3.6   CALCIUM 9.9 9.4   MG  --  2.00    107   CO2 25 22*   BUN 19.0 16.0   CREATININE 0.71 0.64   EGFRNORACEVR >60 >60   GLUCOSE 105 68*   BILITOT 0.5 0.5   ALKPHOS 98 102   ALT 5 12   AST 18 19   ALBUMIN 4.0 3.7   WBC 9.03 10.08   HGB 12.4 12.6   HCT 38.3 38.9         Diet Order:  "Diet Full Liquid Standard Tray  Oral Supplement Order: none  Appetite/Oral Intake: not applicable/not applicable  Factors Affecting Nutritional Intake: chewing difficulty and difficulty/impaired swallowing  Food/Hinduism/Cultural Preferences: none reported  Food Allergies: none reported       Wound(s):   none noted    Comments    3/19/2025: No significant fat/muscle wasting per NFPE. Pt reports a good appetite/PO intake prior to admit. Pt NPO at time of visit, now on Full Liquid diet. Pt declined ONS. Encouraged adequate PO intake. Pt denies N/V/D/C. Pt reports chewing/swallowing difficulties. Per EMR, pt weighed 78.5 kg on 2024. Last BM documented as 3/19/2025. Will monitor.    Anthropometrics    Height: 5' 7" (170.2 cm) Height Method: Stated  Last Weight: 79.4 kg (175 lb) (25 023) Weight Method: Bed Scale  BMI (Calculated): 27.4  BMI Classification: overweight (BMI 25-29.9)     Ideal Body Weight (IBW), Female: 135 lb     % Ideal Body Weight, Female (lb): 129.63 %                    Usual Body Weight (UBW), k.5 kg  % Usual Body Weight: 101.33     Usual Weight Provided By: EMR weight history    Wt Readings from Last 5 Encounters:   25 79.4 kg (175 lb)   25 79.4 kg (175 lb)   24 79.4 kg (175 lb)   24 78.5 kg (173 lb)   24 78.5 kg (173 lb)     Weight Change(s) Since Admission:  Admit Weight: 79.4 kg (175 lb) (25 0236)  3/19/2025: 79.4 kg    Patient Education    Not applicable.    Monitoring & Evaluation     Dietitian will monitor food and beverage intake, weight, electrolyte/renal panel, glucose/endocrine profile, and gastrointestinal profile.  Nutrition Risk/Follow-Up: low (follow-up in 5-7 days)  Patients assigned 'low nutrition risk' status do not qualify for a full nutritional assessment but will be monitored and re-evaluated in a 5-7 day time period. Please consult if re-evaluation needed sooner.    "

## 2025-03-19 NOTE — ANESTHESIA PROCEDURE NOTES
Intubation    Date/Time: 3/19/2025 2:27 PM    Performed by: Prabhu Ramirez CRNA  Authorized by: Erwin Julian MD    Intubation:     Induction:  Rapid sequence induction    Intubated:  Postinduction    Mask Ventilation:  Not attempted    Attempts:  1    Attempted By:  CRNA    Method of Intubation:  Direct    Blade:  Dunn 2    Laryngeal View Grade: Grade IIA - cords partially seen      Difficult Airway Encountered?: No      Complications:  None    Airway Device:  Oral endotracheal tube    Airway Device Size:  7.0    Style/Cuff Inflation:  Cuffed (inflated to minimal occlusive pressure)    Tube secured:  22    Secured at:  The lips    Placement Verified By:  Capnometry    Complicating Factors:  None    Findings Post-Intubation:  BS equal bilateral and atraumatic/condition of teeth unchanged

## 2025-03-19 NOTE — PROCEDURES
Juana Xavier   MRN: [id number]   ADMISSION DATE: 3/18/2025  : 1935  AGE: 89 y.o.    DATE OF PROCEDURE:  2025     PROCEDURE:  EGD, diagnostic    SURGEON: AVRIL GAMBINO    PREOPERATIVE DIAGNOSIS:  1. Dysphagia   2. Probable food bolus impaction.    POSTOPERATIVE DIAGNOSIS:  1.  LA Class D esophagitis.  2. Small ulcerations, cardia, clean based.  3. Erosive gastritis.  No biopsies were obtained.  Otherwise normal exam.      HISTORY AND PHYSICAL:  As per preoperative note.      DESCRIPTION OF PROCEDURE:  Informed consent was obtained.  Patient was placed in left lateral position.  Sedation per anesthesia service.  Olympus video gastroscope was introduced into the oral cavity and esophagus was intubated under direct visualization. The scope was carefully advanced to the distal duodenum.  Patient tolerated the procedure well without any complications.    FINDINGS:  Esophagus:  There was evidence of distal esophageal ulceration which was circumferential suggestive of LA class D esophagitis.  Photodocumentation was obtained.  There was a medium-sized food bolus present in mid to distal esophagus which seemed to be loosened up with suctioning.  I was able to go around the food bolus into the stomach.  Minimal residue gastric fundus.  There were small clean based ulcerations approximately 3-4 mm noted in cardia.  No therapeutic intervention was done.  There were scattered erosions noted in the antrum.  No definite ulceration.  Duodenum:  Duodenal bulb, 2nd and 3rd portion of the duodenum appeared unremarkable to the extent of exam.    ESTIMATED BLOOD LOSS:  None    COMPLICATIONS:  None    RECOMMENDATIONS:  1.  Protonix 40 mg a day.    2. Carafate 1 g a.c. and HS.  3. Patient can follow up with me in 3-4 weeks.  4. Chew food well.  Mechanical soft diet.  Start with full liquids diet today.  Advance to soft mechanical diet tomorrow as discussed in detail with the patient's family.  Patient needs to chew her food  well.  5. Can be discharged from GI standpoint if she does okay with full liquid diet today.      Addendum: Consider EGD in 10-12 weeks to document healing of the esophageal ulceration.  Also need to evaluate for underlying ulcerations around cardia

## 2025-03-19 NOTE — PLAN OF CARE
Problem: Adult Inpatient Plan of Care  Goal: Plan of Care Review  Outcome: Progressing  Goal: Patient-Specific Goal (Individualized)  Outcome: Progressing  Goal: Absence of Hospital-Acquired Illness or Injury  Outcome: Progressing  Goal: Optimal Comfort and Wellbeing  Outcome: Progressing  Goal: Readiness for Transition of Care  Outcome: Progressing     Problem: Fall Injury Risk  Goal: Absence of Fall and Fall-Related Injury  Outcome: Progressing  Intervention: Identify and Manage Contributors  Flowsheets (Taken 3/19/2025 2326)  Self-Care Promotion: adaptive equipment use encouraged  Medication Review/Management: medications reviewed

## 2025-03-19 NOTE — PLAN OF CARE
03/19/25 1441   Discharge Assessment   Assessment Type Discharge Planning Assessment   Confirmed/corrected address, phone number and insurance Yes   Confirmed Demographics Correct on Facesheet   Source of Information patient;family   Communicated MIKE with patient/caregiver Date not available/Unable to determine   Reason For Admission acute esophageal obstruction   People in Home alone   Do you expect to return to your current living situation? Yes   Do you have help at home or someone to help you manage your care at home? Yes   Who are your caregiver(s) and their phone number(s)? sister Candy and sister Trang   Prior to hospitilization cognitive status: Alert/Oriented   Current cognitive status: Alert/Oriented   Walking or Climbing Stairs Difficulty yes   Walking or Climbing Stairs ambulation difficulty, requires equipment   Mobility Management cane, rollator   Dressing/Bathing Difficulty yes   Dressing/Bathing bathing difficulty, requires equipment   Dressing/Bathing Management shower chair   Equipment Currently Used at Home cane, straight;rollator;shower chair   Do you currently have service(s) that help you manage your care at home? No   Do you take prescription medications? Yes   Do you have prescription coverage? Yes   Coverage BCBS medicare   Do you have any problems affording any of your prescribed medications? No   Is the patient taking medications as prescribed? yes   Who is going to help you get home at discharge? sister   How do you get to doctors appointments? family or friend will provide   Are you on dialysis? No   Do you take coumadin? No   Discharge Plan A Home   Discharge Plan B Home   DME Needed Upon Discharge  none   Discharge Plan discussed with: Patient;Sibling   Name(s) and Number(s) sister Candy and sister Trang   Transition of Care Barriers None     The patient lives alone and her 2 sisters help take care of her.

## 2025-03-19 NOTE — PLAN OF CARE
Problem: Adult Inpatient Plan of Care  Goal: Plan of Care Review  Outcome: Progressing  Flowsheets (Taken 3/19/2025 0231)  Plan of Care Reviewed With: patient  Goal: Patient-Specific Goal (Individualized)  Outcome: Progressing  Goal: Absence of Hospital-Acquired Illness or Injury  Outcome: Progressing  Intervention: Identify and Manage Fall Risk  Flowsheets (Taken 3/19/2025 0231)  Safety Promotion/Fall Prevention:   assistive device/personal item within reach   nonskid shoes/socks when out of bed   observed patient noncompliance with fall prevention instructions  Intervention: Prevent Skin Injury  Flowsheets (Taken 3/19/2025 0231)  Body Position: position changed independently  Skin Protection: protective footwear used  Device Skin Pressure Protection: tubing/devices free from skin contact  Intervention: Prevent Infection  Flowsheets (Taken 3/19/2025 0231)  Infection Prevention:   rest/sleep promoted   single patient room provided  Goal: Optimal Comfort and Wellbeing  Outcome: Progressing  Intervention: Monitor Pain and Promote Comfort  Flowsheets (Taken 3/19/2025 0231)  Pain Management Interventions:   care clustered   quiet environment facilitated  Intervention: Provide Person-Centered Care  Flowsheets (Taken 3/19/2025 0231)  Trust Relationship/Rapport:   questions encouraged   questions answered  Goal: Readiness for Transition of Care  Outcome: Progressing

## 2025-03-19 NOTE — H&P
[facility] Hospital Medicine History & Physical Examination       Patient Name: Juana Xavier  MRN: [id number]  Patient Class: IP- Inpatient   Admission Date: 03/19/2025   Admitting Service: Hospital Medicine   Length of Stay: 1  Attending Physician: Haresh Forrester MD   Primary Care Provider: Agustin Parra MD  Face-to-Face encounter date: 03/19/2025  Code Status: DNR  Chief Complaint: No chief complaint on file.      Screening for Social Drivers for health:  Patient screened for food insecurity, housing instability, transportation needs, utility difficulties, and interpersonal safety (select all that apply as identified as concern)  []Housing or Food  []Transportation Needs  []Utility Difficulties  []Interpersonal safety  [x]None    Present at Bedside: Son   Patient information was obtained from patient, patient's family, past medical records and ER records.      HISTORY OF PRESENT ILLNESS:   Juana Xavier is a 89 y.o. female with a past medical history of hypertension, hyperlipidemia, breast cancer, and Parkinson's disease who presented to [facility] on 3/18/2025 transferred from [facility] ED for GI services.  Patient presented to outside facility for dysphagia.  Patient reported she ate chicken breast for lunch on 03/17/2025 and felt like it stayed stuck in her throat.  Patient reported vomiting and was unable to even tolerate water.  Patient reported discomfort in her throat and that she had not been able to take any of her medicines, prompting her to outside ED. CBC revealed WBC of 9.03.  CMP was unremarkable.  CT soft tissue neck without contrast revealed no radiopaque foreign body or fluid bolus with fluid layering in the thoracic esophagus.  Patient had continued dysphasia in ED was concerned with possible food bolus secondary to fluid layering in the thoracic esophagus, therefore patient was transferred to [facility] for GI services. Patient was admitted to  hospital medicine service for further medical management.  On exam patient reported continued dysphagia with spitting up some secretions.    PAST MEDICAL HISTORY:     Past Medical History:   Diagnosis Date    Breast cancer     HTN (hypertension)        PAST SURGICAL HISTORY:     Past Surgical History:   Procedure Laterality Date    BREAST LUMPECTOMY Right     HIP REPLACEMENT ARTHROPLASTY Left     TONSILLECTOMY         FAMILY HISTORY:   Heart disease: Mother and father    SOCIAL HISTORY:   Denied alcohol, tobacco or illicit drug use.     ALLERGIES:   Patient has no known allergies.    HOME MEDICATIONS:     Prior to Admission medications    Medication Sig Start Date End Date Taking? Authorizing Provider   amLODIPine (NORVASC) 5 MG tablet Take 5 mg by mouth every morning. 10/7/21  Yes Provider, Historical   atorvastatin (LIPITOR) 10 MG tablet Take 10 mg by mouth every morning. 10/7/21  Yes Provider, Historical   benazepriL (LOTENSIN) 20 MG tablet Take 20 mg by mouth every morning. 10/7/21  Yes Provider, Historical   carbidopa-levodopa  mg (SINEMET)  mg per tablet Take 1 tablet by mouth 4 (four) times daily. 7/16/24 7/16/25 Yes Mary Saul, PINA-EMMANUEL   furosemide (LASIX) 20 MG tablet Take 20 mg by mouth every morning. 10/7/21  Yes Provider, Historical   nabumetone (RELAFEN) 750 MG tablet Take 750 mg by mouth 2 (two) times daily. 4/4/23  Yes Provider, Historical   aspirin (ECOTRIN) 81 MG EC tablet Take 81 mg by mouth every morning.    Provider, Historical   calcium carbonate/vitamin D3 (CALTRATE 600 + D ORAL) Take 1 tablet by mouth every morning.    Provider, Historical   co-enzyme Q-10 30 mg capsule Take 10 mg by mouth every morning.    Provider, Historical   multivitamin (ONE DAILY MULTIVITAMIN) per tablet Take 1 tablet by mouth once daily.    Provider, Historical   vitamin D (VITAMIN D3) 1000 units Tab Take 1,000 Units by mouth every morning.    Provider, Historical   vitamin E 600 UNIT capsule Take 600  Units by mouth every morning.    Provider, Historical     ________________________________________________________________________  INPATIENT LIST OF MEDICATIONS   Current Medications[1]    Scheduled Meds:  Continuous Infusions:  PRN Meds:.  Current Facility-Administered Medications:     acetaminophen, 1,000 mg, Oral, Q6H PRN    aluminum-magnesium hydroxide-simethicone, 30 mL, Oral, QID PRN    bisacodyL, 10 mg, Rectal, Daily PRN    dextrose 50%, 12.5 g, Intravenous, PRN    dextrose 50%, 25 g, Intravenous, PRN    glucagon (human recombinant), 1 mg, Intramuscular, PRN    glucose, 16 g, Oral, PRN    glucose, 24 g, Oral, PRN    melatonin, 6 mg, Oral, Nightly PRN    naloxone, 0.02 mg, Intravenous, PRN    ondansetron, 4 mg, Intravenous, Q4H PRN    senna-docusate 8.6-50 mg, 1 tablet, Oral, BID PRN    sodium chloride 0.9%, 10 mL, Intravenous, PRN      REVIEW OF SYSTEMS:   Except as documented, all other systems reviewed and negative.    PHYSICAL EXAM:     VITAL SIGNS: 24 HRS MIN & MAX LAST   Temp  Min: 97.3 °F (36.3 °C)  Max: 98.5 °F (36.9 °C) 98.3 °F (36.8 °C)   BP  Min: 104/64  Max: 153/69 130/79   Pulse  Min: 80  Max: 135  91   Resp  Min: 16  Max: 20 17   SpO2  Min: 94 %  Max: 97 % 97 %       General appearance: Elderly female in no apparent distress.  HENT: Atraumatic head.   Eyes: Normal extraocular movements.   Neck: Supple.   Lungs: Clear to auscultation bilaterally.   Heart: Regular rate and rhythm.  Abdomen: Soft, non-distended, non-tender.  Skin: No Rash.   Neuro: Awake, alert, and oriented. Motor and sensory exams grossly intact.   Psych/mental status: Appropriate mood and affect. Responds appropriately to questions.     LABS AND IMAGING:     Recent Labs   Lab 03/18/25  1148 03/19/25  0323   WBC 9.03 10.08   RBC 4.16* 4.17*   HGB 12.4 12.6   HCT 38.3 38.9   MCV 92.1 93.3   MCH 29.8 30.2   MCHC 32.4* 32.4*   RDW 12.4 12.5    221   MPV 11.2* 11.7*       Recent Labs   Lab 03/18/25  1148 03/19/25  0323   NA  144 142   K 4.1 3.6    107   CO2 25 22*   BUN 19.0 16.0   CREATININE 0.71 0.64   CALCIUM 9.9 9.4   MG  --  2.00   ALBUMIN 4.0 3.7   ALKPHOS 98 102   ALT 5 12   AST 18 19   BILITOT 0.5 0.5       Microbiology Results (last 7 days)       ** No results found for the last 168 hours. **             CT Soft Tissue Neck WO Contrast  Narrative: EXAMINATION:  CT SOFT TISSUE NECK WITHOUT CONTRAST    CLINICAL HISTORY:  obstruction;    TECHNIQUE:  Axial CT images of the neck were obtained without intravenous contrast. Reformatted images in the sagittal and coronal plane were included.    Automatic exposure control was utilized to limit radiation dose.    DLP: 281 mGy-cm    COMPARISON:  None.    FINDINGS:  The airways are patent.  There is no radiopaque foreign body or fluid bolus identified.  There is fluid layering in the thoracic esophagus.  There are no abnormally enlarged cervical lymph nodes.  The parotid glands and submandibular and unremarkable.  There are multiple subcentimeter thyroid nodules.  There are mild atherosclerotic calcifications the carotid bulbs.  There is no acute abnormality of the orbits or visualized brain parenchyma.  There is trace scattered paranasal sinus mucosal thickening.  There is no destructive bone lesion.  The lung apices are clear.  Impression: 1. No radiopaque foreign body or fluid bolus.  2. Fluid layering in the thoracic esophagus.    Electronically signed by: Jessie Walden  Date:    03/18/2025  Time:    12:13        ASSESSMENT & PLAN:   Assessment:   Dysphagia   Vomiting   Concern for possible food bolus  History of hypertension, hyperlipidemia, breast cancer, and Parkinson's disease      Plan:  NPO   CT soft tissue neck revealed no radiopaque foreign body or fluid bolus, however revealed fluid layering in the thoracic esophagus  GI consulted, appreciate recommendations  IVF   PRN antiemetics  Resume home medications as deemed appropriate once medication reconciliation is  updated  Labs in AM    VTE Prophylaxis:  SCDs    Discharge Planning and Disposition: TBD    Code status in detail patient and son.  Patient elected DNR.  Code discussion also witnessed by patient's nurse RON Sena. Attending MD aware.    Hector NOLASCO PA-C have reviewed and discussed the case with Haresh Forrester MD   Please see the attending MD's addendum for further assessment and plan.    Hector Nichols PA-C  Department of Hospital Medicine   [facility]   03/19/2025    This note was created with the assistance of [employer] voice recognition software. There may be transcription errors as a result of using this technology, however minimal. Effort has been made to assure accuracy of transcription, but any obvious errors or omissions should be clarified with the author of the document.    _______________________________________________________________________________  MD Addendum:  Dr. CONSTANCE , assumed care of this patient today at --am/pm  For the patient encounter, I performed the substantive portion of the visit, I reviewed the NP/PA documentation, treatment plan, and medical decision making.  I had face to face time with this patient     A. History:    B. Physical exam:    C. Medical decision making:      All diagnosis and differential diagnosis have been reviewed; assessment and plan has been documented; I have personally reviewed the labs and test results that are presently available; I have reviewed the patients medication list; I have reviewed the consulting providers response and recommendations. I have reviewed or attempted to review medical records based upon their availability.    All of the patient and family questions have been addressed and answered. Patient's is agreeable to the above stated plan. I will continue to monitor closely and make adjustments to medical management as needed.      03/19/2025          [1]   Current Facility-Administered Medications:      acetaminophen tablet 1,000 mg, 1,000 mg, Oral, Q6H PRN, Joyce Daily, FNP    aluminum-magnesium hydroxide-simethicone 200-200-20 mg/5 mL suspension 30 mL, 30 mL, Oral, QID PRN, Joyce Daily, FNP    bisacodyL suppository 10 mg, 10 mg, Rectal, Daily PRN, Joyce Daily, FNP    dextrose 50% injection 12.5 g, 12.5 g, Intravenous, PRN, Joyce Daily, FNP    dextrose 50% injection 25 g, 25 g, Intravenous, PRN, Joyce Daily, FNP    glucagon (human recombinant) injection 1 mg, 1 mg, Intramuscular, PRN, Joyce Daily, DONNIEP    glucose chewable tablet 16 g, 16 g, Oral, PRN, Joyce Daily, FNP    glucose chewable tablet 24 g, 24 g, Oral, PRN, Joyce Daily, FNP    melatonin tablet 6 mg, 6 mg, Oral, Nightly PRN, Joyce Daily, DONNIEP    naloxone 0.4 mg/mL injection 0.02 mg, 0.02 mg, Intravenous, PRN, Joyce Daily, FNP    ondansetron injection 4 mg, 4 mg, Intravenous, Q4H PRN, Joyce Daily, PINA    senna-docusate 8.6-50 mg per tablet 1 tablet, 1 tablet, Oral, BID PRN, Joyce Daily, DONNIEP    sodium chloride 0.9% flush 10 mL, 10 mL, Intravenous, PRN, Joyce Daily, FNP

## 2025-03-20 VITALS
WEIGHT: 175 LBS | HEART RATE: 79 BPM | OXYGEN SATURATION: 93 % | SYSTOLIC BLOOD PRESSURE: 143 MMHG | BODY MASS INDEX: 27.47 KG/M2 | DIASTOLIC BLOOD PRESSURE: 83 MMHG | HEIGHT: 67 IN | TEMPERATURE: 98 F | RESPIRATION RATE: 16 BRPM

## 2025-03-20 PROBLEM — K22.2 ACUTE ESOPHAGEAL OBSTRUCTION: Status: ACTIVE | Noted: 2025-03-20

## 2025-03-20 LAB
ALBUMIN SERPL-MCNC: 3.1 G/DL (ref 3.4–4.8)
ALBUMIN/GLOB SERPL: 1.5 RATIO (ref 1.1–2)
ALP SERPL-CCNC: 87 UNIT/L (ref 40–150)
ALT SERPL-CCNC: 14 UNIT/L (ref 0–55)
ANION GAP SERPL CALC-SCNC: 7 MEQ/L
AST SERPL-CCNC: 16 UNIT/L (ref 5–34)
BASOPHILS # BLD AUTO: 0.01 X10(3)/MCL
BASOPHILS NFR BLD AUTO: 0.2 %
BILIRUB SERPL-MCNC: 0.4 MG/DL
BUN SERPL-MCNC: 12.1 MG/DL (ref 9.8–20.1)
CALCIUM SERPL-MCNC: 8.9 MG/DL (ref 8.4–10.2)
CHLORIDE SERPL-SCNC: 112 MMOL/L (ref 98–107)
CO2 SERPL-SCNC: 24 MMOL/L (ref 23–31)
CREAT SERPL-MCNC: 0.56 MG/DL (ref 0.55–1.02)
CREAT/UREA NIT SERPL: 22
EOSINOPHIL # BLD AUTO: 0.01 X10(3)/MCL (ref 0–0.9)
EOSINOPHIL NFR BLD AUTO: 0.2 %
ERYTHROCYTE [DISTWIDTH] IN BLOOD BY AUTOMATED COUNT: 12.5 % (ref 11.5–17)
GFR SERPLBLD CREATININE-BSD FMLA CKD-EPI: >60 ML/MIN/1.73/M2
GLOBULIN SER-MCNC: 2.1 GM/DL (ref 2.4–3.5)
GLUCOSE SERPL-MCNC: 84 MG/DL (ref 82–115)
HCT VFR BLD AUTO: 33.8 % (ref 37–47)
HGB BLD-MCNC: 11.1 G/DL (ref 12–16)
IMM GRANULOCYTES # BLD AUTO: 0.02 X10(3)/MCL (ref 0–0.04)
IMM GRANULOCYTES NFR BLD AUTO: 0.3 %
LYMPHOCYTES # BLD AUTO: 0.8 X10(3)/MCL (ref 0.6–4.6)
LYMPHOCYTES NFR BLD AUTO: 12.1 %
MCH RBC QN AUTO: 30.9 PG (ref 27–31)
MCHC RBC AUTO-ENTMCNC: 32.8 G/DL (ref 33–36)
MCV RBC AUTO: 94.2 FL (ref 80–94)
MONOCYTES # BLD AUTO: 0.67 X10(3)/MCL (ref 0.1–1.3)
MONOCYTES NFR BLD AUTO: 10.2 %
NEUTROPHILS # BLD AUTO: 5.08 X10(3)/MCL (ref 2.1–9.2)
NEUTROPHILS NFR BLD AUTO: 77 %
NRBC BLD AUTO-RTO: 0 %
PLATELET # BLD AUTO: 200 X10(3)/MCL (ref 130–400)
PMV BLD AUTO: 11.6 FL (ref 7.4–10.4)
POTASSIUM SERPL-SCNC: 4 MMOL/L (ref 3.5–5.1)
PROT SERPL-MCNC: 5.2 GM/DL (ref 5.8–7.6)
RBC # BLD AUTO: 3.59 X10(6)/MCL (ref 4.2–5.4)
SODIUM SERPL-SCNC: 143 MMOL/L (ref 136–145)
WBC # BLD AUTO: 6.59 X10(3)/MCL (ref 4.5–11.5)

## 2025-03-20 PROCEDURE — 85025 COMPLETE CBC W/AUTO DIFF WBC: CPT | Performed by: PHYSICIAN ASSISTANT

## 2025-03-20 PROCEDURE — 80053 COMPREHEN METABOLIC PANEL: CPT | Performed by: PHYSICIAN ASSISTANT

## 2025-03-20 PROCEDURE — 97165 OT EVAL LOW COMPLEX 30 MIN: CPT

## 2025-03-20 PROCEDURE — 97535 SELF CARE MNGMENT TRAINING: CPT

## 2025-03-20 PROCEDURE — 97162 PT EVAL MOD COMPLEX 30 MIN: CPT

## 2025-03-20 PROCEDURE — 36415 COLL VENOUS BLD VENIPUNCTURE: CPT | Performed by: PHYSICIAN ASSISTANT

## 2025-03-20 RX ORDER — SUCRALFATE 1 G/1
1 TABLET ORAL 4 TIMES DAILY
Qty: 28 TABLET | Refills: 0 | Status: SHIPPED | OUTPATIENT
Start: 2025-03-20 | End: 2025-03-27

## 2025-03-20 RX ORDER — PANTOPRAZOLE SODIUM 40 MG/1
40 TABLET, DELAYED RELEASE ORAL DAILY
Qty: 30 TABLET | Refills: 0 | Status: SHIPPED | OUTPATIENT
Start: 2025-03-20 | End: 2025-04-19

## 2025-03-20 NOTE — PLAN OF CARE
Problem: Physical Therapy  Goal: Physical Therapy Goal  Description: Goals to be met by: 25     Patient will increase functional independence with mobility by performin. Supine to sit with [place]  2. Sit to supine with [place]  3. Sit to stand transfer with Modified [place]  4. Gait  x 300 feet with Modified [place] using Rollator  5. Ascend/descend 8 stairs with bilateral Handrails & Modified [place]     Outcome: Progressing

## 2025-03-20 NOTE — PLAN OF CARE
Problem: Adult Inpatient Plan of Care  Goal: Plan of Care Review  Outcome: Progressing  Flowsheets (Taken 3/19/2025 2258)  Plan of Care Reviewed With: patient  Goal: Patient-Specific Goal (Individualized)  Outcome: Progressing  Goal: Absence of Hospital-Acquired Illness or Injury  Outcome: Progressing  Intervention: Identify and Manage Fall Risk  Flowsheets (Taken 3/19/2025 2258)  Safety Promotion/Fall Prevention:   assistive device/personal item within reach   nonskid shoes/socks when out of bed   side rails raised x 2  Intervention: Prevent Skin Injury  Flowsheets (Taken 3/19/2025 2258)  Body Position: position changed independently  Skin Protection: protective footwear used  Device Skin Pressure Protection: tubing/devices free from skin contact  Intervention: Prevent Infection  Flowsheets (Taken 3/19/2025 2258)  Infection Prevention:   rest/sleep promoted   single patient room provided  Goal: Optimal Comfort and Wellbeing  Outcome: Progressing  Intervention: Monitor Pain and Promote Comfort  Flowsheets (Taken 3/19/2025 2258)  Pain Management Interventions:   care clustered   quiet environment facilitated  Intervention: Provide Person-Centered Care  Flowsheets (Taken 3/19/2025 2258)  Trust Relationship/Rapport:   questions encouraged   questions answered  Goal: Readiness for Transition of Care  Outcome: Progressing     Problem: Fall Injury Risk  Goal: Absence of Fall and Fall-Related Injury  Outcome: Progressing

## 2025-03-20 NOTE — PT/OT/SLP EVAL
Physical Therapy Evaluation    Patient Name:  Juana Xavier   MRN:  [id number]    Recommendations:     Discharge therapy intensity: Low Intensity Therapy   Discharge Equipment Recommendations: none   Barriers to discharge: None    Assessment:     Juana Xavier is a 89 y.o. female admitted with a medical diagnosis of acute esophageal obstruction. Prior to admit, patient lived alone in a SLH with 8 steps (bilateral rails) to enter. At baseline, she is independent and ambulates with a rollator.  She presents with the following impairments/functional limitations: weakness, impaired endurance, impaired functional mobility, decreased safety awareness, gait instability, impaired balance. She required MIN A for bed mobility. Ambulated 215 ft with RW & SBA. No LOB. Patient to benefit from skilled PT to address deficits, improve functional mobility, and progress towards functional independence. Recommending low intensity therapy at discharge. Progress as tolerated.    Rehab Prognosis: Good; patient would benefit from acute skilled PT services to address these deficits and reach maximum level of function.    Recent Surgery: Procedure(s) (LRB):  EGD (ESOPHAGOGASTRODUODENOSCOPY) (N/A) 1 Day Post-Op    Plan:     During this hospitalization, patient would benefit from acute PT services 5 x/week to address the identified rehab impairments via gait training, therapeutic activities, therapeutic exercises, neuromuscular re-education and progress toward the following goals:    Plan of Care Expires:  04/20/25    Subjective     Chief Complaint: none  Patient/Family Comments/goals: none  Pain/Comfort:  Pain Rating 1: 0/10    Patients cultural, spiritual, Baptist conflicts given the current situation: no    Living Environment:  Prior to admit, patient lived alone in a SLH with 8 steps (bilateral rails) to enter. At baseline, she is independent and ambulates with a rollator. Equipment used at home: cane, straight, rollator, shower  chair, bedside commode (lift chair). Upon discharge, patient will have assistance from family.    Objective:     Communicated with nurse prior to session.  Patient found supine with telemetry, peripheral IV  upon PT entry to room.    General Precautions: Standard, fall  Orthopedic Precautions:N/A   Braces: N/A  Respiratory Status: Room air    Exams:  Cognitive Exam:  Patient is oriented to Person, Place, Time, and Situation  Sensation: -       Intact  RLE ROM: WFL  RLE Strength: 4/5 grossly  LLE ROM: WFL  LLE Strength: 4/5 grossly  Skin integrity: Visible skin intact      Functional Mobility:  Bed Mobility:  Supine to Sit: minimum assistance  Transfers:  Sit to Stand:  contact guard assistance with rolling walker  Gait: 215 ft with RW & SBA. No LOB.   Balance: fair      AM-PAC 6 CLICK MOBILITY  Total Score:18     Education Provided:  Role and goals of PT, transfer training, bed mobility, gait training, balance training, safety awareness, assistive device, strengthening exercises, and importance of participating in PT to return to PLOF.    Patient left up in chair with all lines intact, call button in reach, and educated on calling for assistance when ready to return to bed .    GOALS:   Multidisciplinary Problems       Physical Therapy Goals          Problem: Physical Therapy    Goal Priority Disciplines Outcome Interventions   Physical Therapy Goal     PT, PT/OT Progressing    Description: Goals to be met by: 25     Patient will increase functional independence with mobility by performin. Supine to sit with [place]  2. Sit to supine with [place]  3. Sit to stand transfer with Modified [place]  4. Gait  x 300 feet with Modified [place] using Rollator  5. Ascend/descend 8 stairs with bilateral Handrails & Modified [place]                          History:     Past Medical History:   Diagnosis Date    Breast cancer     HTN (hypertension)        Past Surgical History:   Procedure  Laterality Date    BREAST LUMPECTOMY Right     ESOPHAGOGASTRODUODENOSCOPY N/A 3/19/2025    Procedure: EGD (ESOPHAGOGASTRODUODENOSCOPY);  Surgeon: Sobeida Santos MD;  Location: [facility];  Service: Gastroenterology;  Laterality: N/A;  Ampicillin 1 g IV piggyback 30 minutes before procedure.    HIP REPLACEMENT ARTHROPLASTY Left     TONSILLECTOMY         Time Tracking:     PT Received On: 03/20/25  PT Start Time: 0803     PT Stop Time: 0826  PT Total Time (min): 23 min     Billable Minutes: Evaluation 23 minutes      03/20/2025

## 2025-03-20 NOTE — PROGRESS NOTES
Discharge instructions given to patient and sisters regarding meds and follow up appts. Verbalized understanding

## 2025-03-20 NOTE — PT/OT/SLP EVAL
"Occupational Therapy   Evaluation and Discharge Note    Name: Juana Xavier  MRN: [id number]  Admitting Diagnosis:   1. Parkinson's disease without dyskinesia or fluctuating manifestations    2. Acute esophageal obstruction    3. Chest pain    4. Arrhythmia        Recent Surgery: Procedure(s) (LRB):  EGD (ESOPHAGOGASTRODUODENOSCOPY) (N/A) 1 Day Post-Op    Recommendations:     Discharge therapy intensity: Low Intensity Therapy   Discharge Equipment Recommendations: none  Barriers to discharge:  None    Assessment:     Juana Xavier is a 89 y.o. female with a medical diagnosis of The primary encounter diagnosis was Parkinson's disease without dyskinesia or fluctuating manifestations. Diagnoses of Acute esophageal obstruction, Chest pain, and Arrhythmia were also pertinent to this visit. On eval, patient presents with excellent effort, able to ambulate to bathroom and on unit with rollator with SBA. Pt to d/c to home today and OT will sign off.     Plan:     OT to sign off as acute OT services are not warranted at this time.  Please re-consult if situation changes during this hospitalization.    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      Patient was found supine with peripheral IV upon OT entry to room.    General " Precautions: Standard, fall  Orthopedic Precautions:    Braces:      Vital Signs:     Bed Mobility:    Sup to sit with SBA    Functional Mobility/Transfers:    Functional Mobility: ambulated > household distance with rollator with sBA  Ambulated to BR SBA, toilet transfer with raised toilet SBA, GB    Activities of Daily Living:  Toileting mod I    AMPAC 6 Click ADL:  AMPAC Total Score:      Functional Cognition:  intact    Visual Perceptual Skills:  intact    Upper Extremity Function:  Right Upper Extremity:   wfl    Left Upper Extremity:  wfl    Balance:   Good sitting  sBA standing with RW    Additional Treatment:  As above    Patient Education:  Patient provided with verbal education education regarding OT role/goals/POC, safety awareness, and Discharge/DME recommendations.  Understanding was verbalized.     Patient left up in chair with all lines intact and call button in reach.    History:     Past Medical History:   Diagnosis Date    Breast cancer     HTN (hypertension)          Past Surgical History:   Procedure Laterality Date    BREAST LUMPECTOMY Right     ESOPHAGOGASTRODUODENOSCOPY N/A 3/19/2025    Procedure: EGD (ESOPHAGOGASTRODUODENOSCOPY);  Surgeon: Sobeida Santos MD;  Location: [facility];  Service: Gastroenterology;  Laterality: N/A;  Ampicillin 1 g IV piggyback 30 minutes before procedure.    HIP REPLACEMENT ARTHROPLASTY Left     TONSILLECTOMY         Time Tracking:     OT Date of Treatment: 03/20/25  OT Start Time: 0812  OT Stop Time: 0834  OT Total Time (min): 22 min    Billable Minutes:Evaluation low  complexity  Self care x 1    3/20/2025

## 2025-03-20 NOTE — ANESTHESIA POSTPROCEDURE EVALUATION
Anesthesia Post Evaluation    Patient: Juana Xavier    Procedure(s) Performed: Procedure(s) (LRB):  EGD (ESOPHAGOGASTRODUODENOSCOPY) (N/A)    Final Anesthesia Type: general      Patient location during evaluation: PACU  Patient participation: Yes- Able to Participate  Level of consciousness: awake  Post-procedure vital signs: reviewed and stable  Pain management: adequate  Airway patency: patent  DELILAH mitigation strategies: Multimodal analgesia  PONV status at discharge: No PONV  Anesthetic complications: no      Cardiovascular status: hemodynamically stable  Respiratory status: spontaneous ventilation  Hydration status: euvolemic  Follow-up not needed.              Vitals Value Taken Time   /82 03/19/25 15:10   Temp 96.7 03/20/25 05:48   Pulse 98 03/19/25 15:10   Resp 19 03/19/25 15:10   SpO2 94 % 03/19/25 15:10         Event Time   Out of Recovery 03/19/2025 15:01:00         Pain/Ester Score: Ester Score: 10 (3/19/2025  3:12 PM)

## 2025-03-20 NOTE — DISCHARGE SUMMARY
[facility] Medicine Discharge Summary    Admit Date: 3/18/2025  Discharge Date and Time: 3/20/915400:13 AM  Admitting Physician: AMEYA Team  Discharging Physician: Nicki Turner MD.  Primary Care Physician: Agustin Parra MD  Consults: Gastroenterology    Discharge Diagnoses:  Dysphagia   Vomiting   Concern for possible food bolus  History of hypertension, hyperlipidemia, breast cancer, and Parkinson's disease    Hospital Course:   89 y.o. female with a past medical history of hypertension, hyperlipidemia, breast cancer, and Parkinson's disease who presented to [facility] on 3/18/2025 transferred from [facility] ED for GI services.  Patient presented to outside facility for dysphagia.  Patient reported she ate chicken breast for lunch on 03/17/2025 and felt like it stayed stuck in her throat.  Patient reported vomiting and was unable to even tolerate water.  Patient reported discomfort in her throat and that she had not been able to take any of her medicines, prompting her to outside ED. CBC revealed WBC of 9.03.  CMP was unremarkable.  CT soft tissue neck without contrast revealed no radiopaque foreign body or fluid bolus with fluid layering in the thoracic esophagus.  Patient had continued dysphasia in ED was concerned with possible food bolus secondary to fluid layering in the thoracic esophagus, therefore patient was transferred to [facility] for GI services. Patient was admitted to hospital medicine service for further medical management.  On exam patient reported continued dysphagia with spitting up some secretions.  GI was consulted patient had EGD done that showed class D esophagitis and erosive gastritis GI recommended Carafate and Protonix and start full liquid diet and 2 gradually advance the diet on the day of discharge patient was tolerating diet so was discharged home in stable condition  Pt was seen and examined on the day of discharge  Vitals:  VITAL  SIGNS: 24 HRS MIN & MAX LAST   Temp  Min: 97.5 °F (36.4 °C)  Max: 99.5 °F (37.5 °C) 98.1 °F (36.7 °C)   BP  Min: 109/61  Max: 148/81 (!) 143/83   Pulse  Min: 69  Max: 112  79   Resp  Min: 13  Max: 20 16   SpO2  Min: 92 %  Max: 96 % (!) 93 %       Physical Exam:  General appearance: Elderly female in no apparent distress.  HENT: Atraumatic head.   Eyes: Normal extraocular movements.   Neck: Supple.   Lungs: Clear to auscultation bilaterally.   Heart: Regular rate and rhythm.    Procedures Performed: No admission procedures for hospital encounter.     Significant Diagnostic Studies: See Full reports for all details    Recent Labs   Lab 03/18/25  1148 03/19/25 0323 03/20/25  0328   WBC 9.03 10.08 6.59   RBC 4.16* 4.17* 3.59*   HGB 12.4 12.6 11.1*   HCT 38.3 38.9 33.8*   MCV 92.1 93.3 94.2*   MCH 29.8 30.2 30.9   MCHC 32.4* 32.4* 32.8*   RDW 12.4 12.5 12.5    221 200   MPV 11.2* 11.7* 11.6*       Recent Labs   Lab 03/18/25  1148 03/19/25 0323 03/20/25  0328    142 143   K 4.1 3.6 4.0    107 112*   CO2 25 22* 24   BUN 19.0 16.0 12.1   CREATININE 0.71 0.64 0.56   CALCIUM 9.9 9.4 8.9   MG  --  2.00  --    ALBUMIN 4.0 3.7 3.1*   ALKPHOS 98 102 87   ALT 5 12 14   AST 18 19 16   BILITOT 0.5 0.5 0.4        Microbiology Results (last 7 days)       ** No results found for the last 168 hours. **             X-Ray Chest 1 View  Narrative: EXAMINATION:  XR CHEST 1 VIEW    CLINICAL HISTORY:  CHEST PAIN;    TECHNIQUE:  Frontal view(s) of the chest.    COMPARISON:  No relevant comparison studies available at the time of dictation.    FINDINGS:  Normal cardiac silhouette.  The lungs are well-inflated.  No consolidation identified.  No significant pleural effusion or discernible pneumothorax.  Impression: No acute pulmonary process identified.    Electronically signed by: Mekhi Regalado  Date:    03/19/2025  Time:    09:17         Medication List        START taking these medications      pantoprazole 40 MG  tablet  Commonly known as: PROTONIX  Take 1 tablet (40 mg total) by mouth once daily.     sucralfate 1 gram tablet  Commonly known as: CARAFATE  Take 1 tablet (1 g total) by mouth 4 (four) times daily. for 7 days            CONTINUE taking these medications      amLODIPine 5 MG tablet  Commonly known as: NORVASC     aspirin 81 MG EC tablet  Commonly known as: ECOTRIN     atorvastatin 10 MG tablet  Commonly known as: LIPITOR     benazepriL 20 MG tablet  Commonly known as: LOTENSIN     CALTRATE 600 + D ORAL     carbidopa-levodopa  mg  mg per tablet  Commonly known as: SINEMET  Take 1 tablet by mouth 4 (four) times daily.     co-enzyme Q-10 30 mg capsule     furosemide 20 MG tablet  Commonly known as: LASIX     nabumetone 750 MG tablet  Commonly known as: RELAFEN     ONE DAILY MULTIVITAMIN per tablet  Generic drug: multivitamin     vitamin D 1000 units Tab  Commonly known as: VITAMIN D3     vitamin E 600 UNIT capsule               Where to Get Your Medications        These medications were sent to [facility] [address] [address]      Phone: [phone]   pantoprazole 40 MG tablet  sucralfate 1 gram tablet          Explained in detail to the patient about the discharge plan, medications, and follow-up visits. Pt understands and agrees with the treatment plan  Discharge Disposition: Short Term Hospital   Discharged Condition: stable  Diet-   Dietary Orders (From admission, onward)       Start     Ordered    03/20/25 0702  Diet Low Fiber/Residue Standard Tray  Diet effective now        Question:  Tray type:  Answer:  Standard Tray    03/20/25 0701                   Medications Per NM med rec  Activities as tolerated   Follow-up Information       Sobeida Santos MD. Go on 4/17/2025.    Specialty: Gastroenterology  Why: 10 am appointment time.  Contact information:  [address] [place] [address].  [address]  [facility] [id number] [phone]               Fidel  Agustin ARMANDO MD Follow up in 1 week(s).    Specialty: Internal Medicine  Contact information:  [address] [place] [address]  # [address] [id number]  [phone]                           For further questions contact hospitalist office    Discharge time 33 minutes    For worsening symptoms, chest pain, shortness of breath, increased abdominal pain, high grade fever, stroke or stroke like symptoms, immediately go to the nearest Emergency Room or call 911 as soon as possible.      Nicki Espana M.D, on 3/20/2025. at 11:13 AM.

## 2025-04-01 ENCOUNTER — OFFICE VISIT (OUTPATIENT)
Dept: NEUROLOGY | Facility: CLINIC | Age: 89
End: 2025-04-01
Payer: MEDICARE

## 2025-04-01 VITALS
SYSTOLIC BLOOD PRESSURE: 136 MMHG | HEIGHT: 67 IN | DIASTOLIC BLOOD PRESSURE: 86 MMHG | BODY MASS INDEX: 27.47 KG/M2 | WEIGHT: 175 LBS

## 2025-04-01 DIAGNOSIS — G20.A1 PARKINSON'S DISEASE WITHOUT DYSKINESIA OR FLUCTUATING MANIFESTATIONS: Primary | ICD-10-CM

## 2025-04-01 PROCEDURE — 1160F RVW MEDS BY RX/DR IN RCRD: CPT | Mod: CPTII,S$GLB,, | Performed by: NURSE PRACTITIONER

## 2025-04-01 PROCEDURE — 1159F MED LIST DOCD IN RCRD: CPT | Mod: CPTII,S$GLB,, | Performed by: NURSE PRACTITIONER

## 2025-04-01 PROCEDURE — 99215 OFFICE O/P EST HI 40 MIN: CPT | Mod: S$GLB,,, | Performed by: NURSE PRACTITIONER

## 2025-04-01 PROCEDURE — 1111F DSCHRG MED/CURRENT MED MERGE: CPT | Mod: CPTII,S$GLB,, | Performed by: NURSE PRACTITIONER

## 2025-04-01 PROCEDURE — 99999 PR PBB SHADOW E&M-EST. PATIENT-LVL III: CPT | Mod: PBBFAC,,, | Performed by: NURSE PRACTITIONER

## 2025-04-01 PROCEDURE — 1100F PTFALLS ASSESS-DOCD GE2>/YR: CPT | Mod: CPTII,S$GLB,, | Performed by: NURSE PRACTITIONER

## 2025-04-01 PROCEDURE — 3288F FALL RISK ASSESSMENT DOCD: CPT | Mod: CPTII,S$GLB,, | Performed by: NURSE PRACTITIONER

## 2025-04-01 NOTE — PROGRESS NOTES
"  Neurology Follow up Note    Subjective:         Patient ID: Juana Xavier is a 89 y.o. female.    Chief Complaint: Parkinson's disease follow up     HPI:            Pt reports that tremors are overall well controlled; balance is "50/50" some days better than others: did have one fall and one "slip down the side of bed" since last visit    Did have admit to hosp 3/18-20 d/t having food lodged in esophagus: was unable to take PD meds for 2 days and tremors were uncontrolled; once able to take meds as Rx'd: tremors were once again controlled; does have diff w dysphagia: meet deana: has to be fine chop; did not receive a call from  to set up ST that I ordered last OV     Denies hallucinations     ROS: as per HPI, otherwise pertinent systems review is negative          Past Medical History:   Diagnosis Date    Breast cancer     HTN (hypertension)     Parkinson's disease        Past Surgical History:   Procedure Laterality Date    BREAST LUMPECTOMY Right     ESOPHAGOGASTRODUODENOSCOPY N/A 3/19/2025    Procedure: EGD (ESOPHAGOGASTRODUODENOSCOPY);  Surgeon: Sobeida Santos MD;  Location: [facility];  Service: Gastroenterology;  Laterality: N/A;  Ampicillin 1 g IV piggyback 30 minutes before procedure.    HIP REPLACEMENT ARTHROPLASTY Left     TONSILLECTOMY         Family History   Problem Relation Name Age of Onset    Heart disease Mother      Heart disease Father         Social History     Socioeconomic History    Marital status:    Tobacco Use    Smoking status: Never    Smokeless tobacco: Never   Substance and Sexual Activity    Alcohol use: Not Currently   Social History Narrative    ** Merged History Encounter **          Social Drivers of Health     Financial Resource Strain: Low Risk  (3/19/2025)    Overall Financial Resource Strain (CARDIA)     Difficulty of Paying Living Expenses: Not hard at all   Food Insecurity: No Food Insecurity (3/19/2025)    Hunger Vital Sign     Worried About Running Out of Food in " "the Last Year: Never true     Ran Out of Food in the Last Year: Never true   Stress: No Stress Concern Present (3/19/2025)    [place] [employer] - Occupational Stress Questionnaire     Feeling of Stress : Not at all   Housing Stability: Low Risk  (3/19/2025)    Housing Stability Vital Sign     Unable to Pay for Housing in the Last Year: No     Homeless in the Last Year: No       Review of patient's allergies indicates:  No Known Allergies    Current Outpatient Medications   Medication Instructions    amLODIPine (NORVASC) 5 mg, Every morning    aspirin (ECOTRIN) 81 mg, Every morning    atorvastatin (LIPITOR) 10 mg, Every morning    benazepriL (LOTENSIN) 20 mg, Every morning    calcium carbonate/vitamin D3 (CALTRATE 600 + D ORAL) 1 tablet, Every morning    carbidopa-levodopa  mg (SINEMET)  mg per tablet 1 tablet, Oral, 4 times daily    co-enzyme Q-10 10 mg, Every morning    furosemide (LASIX) 20 mg, Every morning    multivitamin (ONE DAILY MULTIVITAMIN) per tablet 1 tablet, Daily    nabumetone (RELAFEN) 1,500 mg, Daily    pantoprazole (PROTONIX) 40 mg, Oral, Daily    vitamin D (VITAMIN D3) 1,000 Units, Every morning    vitamin E 600 Units, Every morning       Objective:      Exam:   Visit Vitals  /86   Ht 5' 7" (1.702 m)   Wt 79.4 kg (175 lb)   BMI 27.41 kg/m²       Physical Exam  Vitals reviewed.   Constitutional:       Appearance: Parkinsonian; masked face and decreased eye blink     Accompanied by: one sister  HENT:      Ears:      Comments: Hearing normal.  Eyes:      Extraocular Movements: Extraocular movements intact.      VF's nml  Cardiovascular:      Rate and Rhythm: Normal rate and regular rhythm. Systolic murmur   Pulmonary:      Effort: Pulmonary effort is normal.      Breath sounds: Normal breath sounds.   Musculoskeletal:         General: Normal range of motion.      1+ B pretibial edema  Skin:     General: Skin is warm and dry.   Neurological:      General: No " focal deficit present.      Mental Status: she is alert and oriented to person, place, and time.      Speech: monotoned and hypophonia, slightly dysarthric      Face: symmetric     Motor: nonlateralizing      Coordination: F to N bradykinetic      Tone: B cogwheel rigidity; R>L     Tremor: RUE rest tremor     Gait : rollator; parkinsonian  Impaired RAE R>L  Psychiatric:         Mood and Affect: Mood normal.         Behavior: Behavior normal.         Assessment/Plan:   1. Parkinson's disease without dyskinesia or fluctuating manifestations (Primary)    Continue present management   CD/LD 1 tab QID    Reminded patient/family about potential PD med side effects, which include but not limited to impulse control disorders (ex: pathologic gambling, shopping, or preoccupation with sexual thoughts or behaviors), excessive daytime sleepiness, hallucinations and sleep attacks. Discussed that driving may pose an increased risk to patients on these medications. Hypotension is also occasionally associated with Parkinson's medications. Any of these complications should be reported to the prescribing physician or provider so that appropriate further modifications can occur.    Fall precautions discussed; continue using the rollator    She does not drive       No follow-ups on file.      Don Nieto, MSN, APRN, AGACNP-BC

## 2025-06-02 ENCOUNTER — EXTERNAL HOME HEALTH (OUTPATIENT)
Dept: HOME HEALTH SERVICES | Facility: HOSPITAL | Age: 89
End: 2025-06-02
Payer: MEDICARE

## 2025-07-15 ENCOUNTER — DOCUMENT SCAN (OUTPATIENT)
Dept: HOME HEALTH SERVICES | Facility: HOSPITAL | Age: 89
End: 2025-07-15
Payer: MEDICARE

## 2025-07-16 ENCOUNTER — DOCUMENT SCAN (OUTPATIENT)
Dept: HOME HEALTH SERVICES | Facility: HOSPITAL | Age: 89
End: 2025-07-16
Payer: MEDICARE

## 2025-07-24 ENCOUNTER — DOCUMENT SCAN (OUTPATIENT)
Dept: HOME HEALTH SERVICES | Facility: HOSPITAL | Age: 89
End: 2025-07-24
Payer: MEDICARE

## 2025-08-04 ENCOUNTER — OFFICE VISIT (OUTPATIENT)
Dept: NEUROLOGY | Facility: CLINIC | Age: 89
End: 2025-08-04
Payer: MEDICARE

## 2025-08-04 VITALS
SYSTOLIC BLOOD PRESSURE: 148 MMHG | DIASTOLIC BLOOD PRESSURE: 92 MMHG | BODY MASS INDEX: 27.47 KG/M2 | HEIGHT: 67 IN | WEIGHT: 175 LBS

## 2025-08-04 DIAGNOSIS — G20.A1 PARKINSON'S DISEASE: ICD-10-CM

## 2025-08-04 DIAGNOSIS — G20.A1 PARKINSON'S DISEASE WITHOUT DYSKINESIA OR FLUCTUATING MANIFESTATIONS: Primary | ICD-10-CM

## 2025-08-04 PROCEDURE — 1101F PT FALLS ASSESS-DOCD LE1/YR: CPT | Mod: CPTII,S$GLB,, | Performed by: NURSE PRACTITIONER

## 2025-08-04 PROCEDURE — 99215 OFFICE O/P EST HI 40 MIN: CPT | Mod: S$GLB,,, | Performed by: NURSE PRACTITIONER

## 2025-08-04 PROCEDURE — 1160F RVW MEDS BY RX/DR IN RCRD: CPT | Mod: CPTII,S$GLB,, | Performed by: NURSE PRACTITIONER

## 2025-08-04 PROCEDURE — 99999 PR PBB SHADOW E&M-EST. PATIENT-LVL III: CPT | Mod: PBBFAC,,, | Performed by: NURSE PRACTITIONER

## 2025-08-04 PROCEDURE — 3288F FALL RISK ASSESSMENT DOCD: CPT | Mod: CPTII,S$GLB,, | Performed by: NURSE PRACTITIONER

## 2025-08-04 PROCEDURE — 1159F MED LIST DOCD IN RCRD: CPT | Mod: CPTII,S$GLB,, | Performed by: NURSE PRACTITIONER

## 2025-08-04 RX ORDER — CARBIDOPA AND LEVODOPA 25; 100 MG/1; MG/1
1.5 TABLET ORAL 4 TIMES DAILY
Qty: 540 TABLET | Refills: 3 | Status: SHIPPED | OUTPATIENT
Start: 2025-08-04

## 2025-08-04 NOTE — PROGRESS NOTES
Neurology Follow up Note    Subjective:       Patient ID:   Juana Xavier is a 89 y.o. female.    Chief Complaint: PD    HPI:            Rt hand and jaw tremors are abt the same. Denies freezing shuffled gait.     Denies diff w swallowing or drooling.     Sleeping OK wo vivid dreams.     Lives alone does not drive and needs asst w bathing.     Pts sister Candy is here today.     She reports variable balance that is 50/50, with some days better than others. Denies falls; uses a rollator    She experiences progressive difficulty with fine motor movements, particularly involving the first two fingers of her hand, with intermittent challenges coordinating finger movements that impact her ability to grasp and manipulate objects. Some days are more challenging than others with decreased ability to precisely control her first two fingers when attempting to  items.     She continues to independently perform household tasks including laundry and hanging clothes but notes significant decrease in efficiency. Tasks now take approximately 3 times longer than previously, with what used to take 15 minutes now requiring 45 minutes to complete. She remains active and safe while performing these activities, though with reduced speed.     She has been attending speech therapy for swallowing difficulties and was provided with exercises including word pronunciation, number counting, and mouth stretching techniques. Her swallowing has improved significantly and she no longer experiences significant difficulty swallowing. She can consume food without needing additional strategies like using applesauce and denies current swallowing challenges, reporting feeling more confident with oral intake.     She experiences sudden onset of sleepiness during the day, describing episodes where she sits in a chair and falls asleep for approximately 30 minutes and then drifts in and out for several hours. She has nocturnal sleep disruption,  waking multiple times per week to use the bathroom and subsequently unable to return to sleep. She acknowledges that sleeping during the day may further impact her nighttime sleep quality. Her sleep varies, with some nights being better than others.     She currently takes Carbidopa-Levodopa 1 tablet 4 times daily and a fluid pill in the morning. She is open to potential medication dose adjustment to address motor symptoms.     Denies hallucinations              ROS: as per HPI, otherwise pertinent systems review is negative          Past Medical History:   Diagnosis Date    Breast cancer     HTN (hypertension)     Parkinson's disease        Past Surgical History:   Procedure Laterality Date    BREAST LUMPECTOMY Right     ESOPHAGOGASTRODUODENOSCOPY N/A 3/19/2025    Procedure: EGD (ESOPHAGOGASTRODUODENOSCOPY);  Surgeon: Sobeida Santos MD;  Location: [facility];  Service: Gastroenterology;  Laterality: N/A;  Ampicillin 1 g IV piggyback 30 minutes before procedure.    HIP REPLACEMENT ARTHROPLASTY Left     TONSILLECTOMY         Family History   Problem Relation Name Age of Onset    Heart disease Mother      Heart disease Father         Social History     Socioeconomic History    Marital status:    Tobacco Use    Smoking status: Never    Smokeless tobacco: Never   Substance and Sexual Activity    Alcohol use: Not Currently   Social History Narrative    ** Merged History Encounter **          Social Drivers of Health     Financial Resource Strain: Low Risk  (3/19/2025)    Overall Financial Resource Strain (CARDIA)     Difficulty of Paying Living Expenses: Not hard at all   Food Insecurity: No Food Insecurity (3/19/2025)    Hunger Vital Sign     Worried About Running Out of Food in the Last Year: Never true     Ran Out of Food in the Last Year: Never true   Stress: No Stress Concern Present (3/19/2025)    Citizen of [place] of Occupational Health - Occupational Stress Questionnaire     Feeling of Stress : Not at all  "  Housing Stability: Low Risk  (3/19/2025)    Housing Stability Vital Sign     Unable to Pay for Housing in the Last Year: No     Homeless in the Last Year: No       Review of patient's allergies indicates:  No Known Allergies    Current Outpatient Medications   Medication Instructions    amLODIPine (NORVASC) 5 mg, Every morning    aspirin (ECOTRIN) 81 mg, Every morning    atorvastatin (LIPITOR) 10 mg, Every morning    benazepriL (LOTENSIN) 20 mg, Every morning    calcium carbonate/vitamin D3 (CALTRATE 600 + D ORAL) 1 tablet, Every morning    carbidopa-levodopa  mg (SINEMET)  mg per tablet 1 tablet, Oral, 4 times daily    co-enzyme Q-10 10 mg, Every morning    furosemide (LASIX) 20 mg, Every morning    multivitamin (ONE DAILY MULTIVITAMIN) per tablet 1 tablet, Daily    nabumetone (RELAFEN) 1,500 mg, Daily    pantoprazole (PROTONIX) 40 mg, Oral, Daily    vitamin D (VITAMIN D3) 1,000 Units, Every morning    vitamin E 600 Units, Every morning       Objective:      Exam:   Visit Vitals  BP (!) 148/92   Ht 5' 7" (1.702 m)   Wt 79.4 kg (175 lb)   BMI 27.41 kg/m²       Physical Exam  Vitals reviewed.   Constitutional:       Appearance: Parkinsonian; masked face and decreased eye blink     Accompanied by: one sister Candy  HENT:      Ears:      Comments: Hearing normal.  Eyes:      Extraocular Movements: Extraocular movements intact.      VF's nml  Cardiovascular:      Rate and Rhythm: Normal rate and regular rhythm. Systolic murmur   Pulmonary:      Effort: Pulmonary effort is normal.      Breath sounds: Normal breath sounds.   Musculoskeletal:         General: Normal range of motion.      1+ B pretibial edema  Skin:     General: Skin is warm and dry.   Neurological:      General: No focal deficit present.      Mental Status: she is alert and oriented to person, place, and time.      Speech: monotoned and hypophonia, slightly dysarthric      Face: symmetric     Motor: nonlateralizing      Coordination: F to N " bradykinetic      Tone: B cogwheel rigidity; R>L     Tremor: LUE rest tremor     Gait : rollator; parkinsonian  Impaired RAE R>L  Psychiatric:         Mood and Affect: Mood normal.         Behavior: Behavior normal.         Assessment/Plan:   1. Parkinson's disease without dyskinesia or fluctuating manifestations (Primary)      IMPRESSION:  - Assessed gait, mobility, hand dexterity, and strength, noting increased bradykinesia and slower movements.  - Considered increasing Carbidopa-Levodopa dose due to observed symptoms and relatively low current dose.  - Decided against adding adjunctive medication at this time.      PLAN SUMMARY:  - Increased Carbidopa-Levodopa dosage to 1.5 tabs 4 times daily  - Patient can revert to previous dosage (1 tab 4 times daily) if new dose is not well-tolerated  - No need to contact office if reverting to previous dosage    Reminded patient/family about potential PD med side effects, which include but not limited to impulse control disorders (ex: pathologic gambling, shopping, or preoccupation with sexual thoughts or behaviors), excessive daytime sleepiness, hallucinations and sleep attacks. Discussed that driving may pose an increased risk to patients on these medications. Hypotension is also occasionally associated with Parkinson's medications. Any of these complications should be reported to the prescribing physician or provider so that appropriate further modifications can occur.    Fall precautions discussed      - Explained that fatigue and drowsiness can be attributed to a combination of Parkinson's disease, medication side effects, and age.  - Discussed that L-Dopa can cause drowsiness and fatigue.  - Increased Carbidopa-Levodopa from 1 tab 4 times daily to 1.5 tabs 4 times daily.  - Patient can return to previous dosage (1 tab 4 times daily) if new dose is not well-tolerated, without needing to contact the office.      - Clarified that occasional daytime napping is common and  not concerning at age.    LIFESTYLE CHANGES:  - Clarified that occasional daytime napping is common and not concerning at age.         This note was generated with the assistance of ambient listening technology. Verbal consent was obtained by the patient and accompanying visitor(s) for the recording of patient appointment to facilitate this note. I attest to having reviewed and edited the generated note for accuracy, though some syntax or spelling errors may persist. Please contact the author of this note for any clarification.    Follow up in about 3 months (around 11/4/2025), or if symptoms worsen or fail to improve, for Parkinson's Disease. With Dr. Mayen.      Don Nieto, MSN, APRN, AGACNP-BC

## (undated) DEVICE — KIT CANIST SUCTION 1200CC

## (undated) DEVICE — SOL BSS IRRIGATION 500ML

## (undated) DEVICE — GOWN POLY REINF BRTH SLV LG

## (undated) DEVICE — COLLECTION SPECIMEN NEPTUNE

## (undated) DEVICE — KIT SURGICAL COLON .25 1.1OZ

## (undated) DEVICE — TUBING O2 FEMALE CONN 13FT

## (undated) DEVICE — GLOVE PROTEXIS LTX 7.5

## (undated) DEVICE — PACK EYE PHACO CUSTOM BUNDLE

## (undated) DEVICE — TIP SUCTION YANKAUER

## (undated) DEVICE — BITE BLOCK ADULT

## (undated) DEVICE — SOL IRRI STRL WATER 1000ML